# Patient Record
Sex: FEMALE | Race: BLACK OR AFRICAN AMERICAN
[De-identification: names, ages, dates, MRNs, and addresses within clinical notes are randomized per-mention and may not be internally consistent; named-entity substitution may affect disease eponyms.]

---

## 2017-01-17 ENCOUNTER — HOSPITAL ENCOUNTER (EMERGENCY)
Dept: HOSPITAL 35 - ER | Age: 62
Discharge: HOME | End: 2017-01-17
Payer: COMMERCIAL

## 2017-01-17 VITALS — HEIGHT: 63 IN | WEIGHT: 223 LBS | BODY MASS INDEX: 39.51 KG/M2

## 2017-01-17 DIAGNOSIS — Z88.1: ICD-10-CM

## 2017-01-17 DIAGNOSIS — R10.2: ICD-10-CM

## 2017-01-17 DIAGNOSIS — R10.9: Primary | ICD-10-CM

## 2017-01-17 DIAGNOSIS — Z88.5: ICD-10-CM

## 2017-01-17 DIAGNOSIS — Z91.018: ICD-10-CM

## 2017-01-17 DIAGNOSIS — Z90.710: ICD-10-CM

## 2017-01-17 DIAGNOSIS — Z96.659: ICD-10-CM

## 2017-01-17 DIAGNOSIS — Z87.442: ICD-10-CM

## 2017-01-17 DIAGNOSIS — Z88.8: ICD-10-CM

## 2017-01-17 LAB
ALBUMIN SERPL-MCNC: 3.6 G/DL (ref 3.4–5)
ALP SERPL-CCNC: 102 U/L (ref 46–116)
ALT SERPL-CCNC: 33 U/L (ref 30–65)
ANION GAP SERPL CALC-SCNC: 8 MMOL/L (ref 7–16)
APTT BLD: 25.4 SECONDS (ref 24.5–32.8)
AST SERPL-CCNC: 14 U/L (ref 15–37)
BILIRUB SERPL-MCNC: 0.2 MG/DL
BILIRUB UR-MCNC: NEGATIVE MG/DL
BUN SERPL-MCNC: 13 MG/DL (ref 7–18)
CALCIUM SERPL-MCNC: 9.2 MG/DL (ref 8.5–10.1)
CHLORIDE SERPL-SCNC: 101 MMOL/L (ref 98–107)
CO2 SERPL-SCNC: 31 MMOL/L (ref 21–32)
COLOR UR: YELLOW
CREAT SERPL-MCNC: 0.8 MG/DL (ref 0.6–1.3)
ERYTHROCYTE [DISTWIDTH] IN BLOOD BY AUTOMATED COUNT: 15.1 % (ref 10.5–14.5)
GLUCOSE SERPL-MCNC: 150 MG/DL (ref 70–99)
HCT VFR BLD CALC: 38.8 % (ref 37–47)
HGB BLD-MCNC: 12.9 GM/DL (ref 12–15)
INR PPP: 1.1
KETONES UR STRIP-MCNC: NEGATIVE MG/DL
MCH RBC QN AUTO: 28.9 PG (ref 26–34)
MCHC RBC AUTO-ENTMCNC: 33.4 % (ref 28–37)
MCV RBC: 86.7 FL (ref 80–100)
PLATELET # BLD: 181 THOU/UL (ref 150–400)
POTASSIUM SERPL-SCNC: 3.9 MMOL/L (ref 3.5–5.1)
PROT SERPL-MCNC: 7.5 G/DL (ref 6.4–8.2)
PROTHROMBIN TIME: 10.9 SECONDS (ref 9.3–11.4)
RBC # BLD AUTO: 4.48 MIL/UL (ref 4.2–5)
RBC # UR STRIP: NEGATIVE /UL
SODIUM SERPL-SCNC: 140 MMOL/L (ref 136–145)
SP GR UR STRIP: 1.02 (ref 1–1.03)
URINE GLUCOSE-RANDOM*: NEGATIVE
URINE LEUKOCYTES-REFLEX: NEGATIVE
URINE PROTEIN (DIPSTICK): NEGATIVE
UROBILINOGEN UR STRIP-ACNC: 0.2 E.U./DL (ref 0.2–1)
WBC # BLD AUTO: 5.7 THOU/UL (ref 4–11)

## 2017-03-21 ENCOUNTER — HOSPITAL ENCOUNTER (EMERGENCY)
Dept: HOSPITAL 35 - ER | Age: 62
Discharge: HOME | End: 2017-03-21
Payer: COMMERCIAL

## 2017-03-21 VITALS — BODY MASS INDEX: 37.74 KG/M2 | WEIGHT: 213.01 LBS | HEIGHT: 63 IN

## 2017-03-21 DIAGNOSIS — Z96.653: ICD-10-CM

## 2017-03-21 DIAGNOSIS — Z91.018: ICD-10-CM

## 2017-03-21 DIAGNOSIS — Z88.8: ICD-10-CM

## 2017-03-21 DIAGNOSIS — Z98.890: ICD-10-CM

## 2017-03-21 DIAGNOSIS — Z87.442: ICD-10-CM

## 2017-03-21 DIAGNOSIS — Z88.1: ICD-10-CM

## 2017-03-21 DIAGNOSIS — N39.0: ICD-10-CM

## 2017-03-21 DIAGNOSIS — Z88.5: ICD-10-CM

## 2017-03-21 DIAGNOSIS — Z90.710: ICD-10-CM

## 2017-03-21 DIAGNOSIS — A60.04: Primary | ICD-10-CM

## 2017-03-21 LAB
BILIRUB UR-MCNC: NEGATIVE MG/DL
COLOR UR: YELLOW
KETONES UR STRIP-MCNC: NEGATIVE MG/DL
RBC # UR STRIP: (no result) /UL
RBC #/AREA URNS HPF: (no result) /HPF (ref 0–2)
SP GR UR STRIP: 1.02 (ref 1–1.03)
SQUAMOUS: (no result) /LPF (ref 0–3)
TRANSITIONAL EPITHEL CELL: (no result) /LPF
URINE GLUCOSE-RANDOM*: (no result)
URINE LEUKOCYTES-REFLEX: (no result)
URINE PROTEIN (DIPSTICK): (no result)
URINE WBC-REFLEX: (no result) /HPF (ref 0–5)
UROBILINOGEN UR STRIP-ACNC: 0.2 E.U./DL (ref 0.2–1)

## 2017-09-07 ENCOUNTER — HOSPITAL ENCOUNTER (EMERGENCY)
Dept: HOSPITAL 35 - ER | Age: 62
Discharge: HOME | End: 2017-09-07
Payer: COMMERCIAL

## 2017-09-07 VITALS — HEIGHT: 64 IN | WEIGHT: 214 LBS | BODY MASS INDEX: 36.54 KG/M2

## 2017-09-07 DIAGNOSIS — Z96.659: ICD-10-CM

## 2017-09-07 DIAGNOSIS — Z88.1: ICD-10-CM

## 2017-09-07 DIAGNOSIS — Z88.8: ICD-10-CM

## 2017-09-07 DIAGNOSIS — Z87.442: ICD-10-CM

## 2017-09-07 DIAGNOSIS — R07.89: Primary | ICD-10-CM

## 2017-09-07 DIAGNOSIS — Z98.890: ICD-10-CM

## 2017-09-07 DIAGNOSIS — Z91.018: ICD-10-CM

## 2017-09-07 DIAGNOSIS — Z90.710: ICD-10-CM

## 2017-09-07 LAB
ANION GAP SERPL CALC-SCNC: 6 MMOL/L (ref 7–16)
BASOPHILS NFR BLD AUTO: 0.5 % (ref 0–2)
BUN SERPL-MCNC: 11 MG/DL (ref 7–18)
CALCIUM SERPL-MCNC: 9.5 MG/DL (ref 8.5–10.1)
CHLORIDE SERPL-SCNC: 106 MMOL/L (ref 98–107)
CO2 SERPL-SCNC: 28 MMOL/L (ref 21–32)
CREAT SERPL-MCNC: 0.9 MG/DL (ref 0.6–1)
EOSINOPHIL NFR BLD: 3.5 % (ref 0–3)
ERYTHROCYTE [DISTWIDTH] IN BLOOD BY AUTOMATED COUNT: 16.2 % (ref 10.5–14.5)
GLUCOSE SERPL-MCNC: 121 MG/DL (ref 74–106)
GRANULOCYTES NFR BLD MANUAL: 52.2 % (ref 36–66)
HCT VFR BLD CALC: 38.9 % (ref 37–47)
HGB BLD-MCNC: 13.4 GM/DL (ref 12–15)
LYMPHOCYTES NFR BLD AUTO: 36.5 % (ref 24–44)
MANUAL DIFFERENTIAL PERFORMED BLD QL: NO
MCH RBC QN AUTO: 29.1 PG (ref 26–34)
MCHC RBC AUTO-ENTMCNC: 34.4 G/DL (ref 28–37)
MCV RBC: 84.5 FL (ref 80–100)
MONOCYTES NFR BLD: 7.3 % (ref 1–8)
NEUTROPHILS # BLD: 4 THOU/UL (ref 1.4–8.2)
PLATELET # BLD: 217 THOU/UL (ref 150–400)
POTASSIUM SERPL-SCNC: 4 MMOL/L (ref 3.5–5.1)
RBC # BLD AUTO: 4.6 MIL/UL (ref 4.2–5)
SODIUM SERPL-SCNC: 140 MMOL/L (ref 136–145)
TROPONIN I SERPL-MCNC: 0.04 NG/ML
WBC # BLD AUTO: 7.7 THOU/UL (ref 4–11)

## 2017-12-27 ENCOUNTER — HOSPITAL ENCOUNTER (INPATIENT)
Dept: HOSPITAL 35 - ER | Age: 62
LOS: 2 days | Discharge: HOME | DRG: 313 | End: 2017-12-29
Attending: INTERNAL MEDICINE | Admitting: INTERNAL MEDICINE
Payer: COMMERCIAL

## 2017-12-27 VITALS — BODY MASS INDEX: 38.93 KG/M2 | HEIGHT: 64.02 IN | WEIGHT: 228 LBS

## 2017-12-27 VITALS — DIASTOLIC BLOOD PRESSURE: 63 MMHG | SYSTOLIC BLOOD PRESSURE: 146 MMHG

## 2017-12-27 VITALS — DIASTOLIC BLOOD PRESSURE: 63 MMHG | SYSTOLIC BLOOD PRESSURE: 113 MMHG

## 2017-12-27 VITALS — SYSTOLIC BLOOD PRESSURE: 142 MMHG | DIASTOLIC BLOOD PRESSURE: 40 MMHG

## 2017-12-27 VITALS — SYSTOLIC BLOOD PRESSURE: 133 MMHG | DIASTOLIC BLOOD PRESSURE: 63 MMHG

## 2017-12-27 VITALS — DIASTOLIC BLOOD PRESSURE: 96 MMHG | SYSTOLIC BLOOD PRESSURE: 181 MMHG

## 2017-12-27 DIAGNOSIS — Z96.653: ICD-10-CM

## 2017-12-27 DIAGNOSIS — Z90.710: ICD-10-CM

## 2017-12-27 DIAGNOSIS — Z82.49: ICD-10-CM

## 2017-12-27 DIAGNOSIS — Z87.442: ICD-10-CM

## 2017-12-27 DIAGNOSIS — R07.9: Primary | ICD-10-CM

## 2017-12-27 DIAGNOSIS — G89.29: ICD-10-CM

## 2017-12-27 DIAGNOSIS — Z83.3: ICD-10-CM

## 2017-12-27 DIAGNOSIS — Z79.4: ICD-10-CM

## 2017-12-27 DIAGNOSIS — E78.00: ICD-10-CM

## 2017-12-27 DIAGNOSIS — E66.9: ICD-10-CM

## 2017-12-27 DIAGNOSIS — Z88.8: ICD-10-CM

## 2017-12-27 DIAGNOSIS — Z91.010: ICD-10-CM

## 2017-12-27 DIAGNOSIS — Z88.1: ICD-10-CM

## 2017-12-27 DIAGNOSIS — E11.43: ICD-10-CM

## 2017-12-27 DIAGNOSIS — Z86.711: ICD-10-CM

## 2017-12-27 DIAGNOSIS — E78.5: ICD-10-CM

## 2017-12-27 DIAGNOSIS — Z80.1: ICD-10-CM

## 2017-12-27 DIAGNOSIS — Z79.899: ICD-10-CM

## 2017-12-27 DIAGNOSIS — M54.9: ICD-10-CM

## 2017-12-27 DIAGNOSIS — K31.84: ICD-10-CM

## 2017-12-27 DIAGNOSIS — I10: ICD-10-CM

## 2017-12-27 LAB
ANION GAP SERPL CALC-SCNC: 5 MMOL/L (ref 7–16)
BASOPHILS NFR BLD AUTO: 0.9 % (ref 0–2)
BUN SERPL-MCNC: 15 MG/DL (ref 7–18)
CALCIUM SERPL-MCNC: 9.2 MG/DL (ref 8.5–10.1)
CHLORIDE SERPL-SCNC: 107 MMOL/L (ref 98–107)
CO2 SERPL-SCNC: 29 MMOL/L (ref 21–32)
CREAT SERPL-MCNC: 0.8 MG/DL (ref 0.6–1)
EOSINOPHIL NFR BLD: 1.3 % (ref 0–3)
ERYTHROCYTE [DISTWIDTH] IN BLOOD BY AUTOMATED COUNT: 13.9 % (ref 10.5–14.5)
GLUCOSE SERPL-MCNC: 163 MG/DL (ref 74–106)
GRANULOCYTES NFR BLD MANUAL: 50.2 % (ref 36–66)
HCT VFR BLD CALC: 39.5 % (ref 37–47)
HGB BLD-MCNC: 13.2 GM/DL (ref 12–15)
LYMPHOCYTES NFR BLD AUTO: 40.4 % (ref 24–44)
MCH RBC QN AUTO: 30.1 PG (ref 26–34)
MCHC RBC AUTO-ENTMCNC: 33.4 G/DL (ref 28–37)
MCV RBC: 90.2 FL (ref 80–100)
MONOCYTES NFR BLD: 7.2 % (ref 1–8)
NEUTROPHILS # BLD: 3.3 THOU/UL (ref 1.4–8.2)
PLATELET # BLD: 211 THOU/UL (ref 150–400)
POTASSIUM SERPL-SCNC: 3.9 MMOL/L (ref 3.5–5.1)
RBC # BLD AUTO: 4.38 MIL/UL (ref 4.2–5)
SODIUM SERPL-SCNC: 141 MMOL/L (ref 136–145)
TROPONIN I SERPL-MCNC: 0.08 NG/ML (ref ?–0.06)
WBC # BLD AUTO: 6.5 THOU/UL (ref 4–11)

## 2017-12-27 PROCEDURE — 10879: CPT

## 2017-12-28 VITALS — DIASTOLIC BLOOD PRESSURE: 75 MMHG | SYSTOLIC BLOOD PRESSURE: 136 MMHG

## 2017-12-28 VITALS — DIASTOLIC BLOOD PRESSURE: 83 MMHG | SYSTOLIC BLOOD PRESSURE: 141 MMHG

## 2017-12-28 VITALS — DIASTOLIC BLOOD PRESSURE: 75 MMHG | SYSTOLIC BLOOD PRESSURE: 120 MMHG

## 2017-12-28 VITALS — DIASTOLIC BLOOD PRESSURE: 61 MMHG | SYSTOLIC BLOOD PRESSURE: 140 MMHG

## 2017-12-28 VITALS — SYSTOLIC BLOOD PRESSURE: 125 MMHG | DIASTOLIC BLOOD PRESSURE: 74 MMHG

## 2017-12-28 LAB
CHOLEST SERPL-MCNC: 145 MG/DL (ref ?–200)
HDLC SERPL-MCNC: 55 MG/DL (ref 40–?)
LDLC SERPL-MCNC: 76 MG/DL (ref ?–100)
TC:HDL: 2.6 RATIO
TRIGL SERPL-MCNC: 71 MG/DL (ref ?–150)
TROPONIN I SERPL-MCNC: 0.08 NG/ML (ref ?–0.06)
VLDLC SERPL CALC-MCNC: 14 MG/DL (ref ?–40)

## 2017-12-29 VITALS — DIASTOLIC BLOOD PRESSURE: 70 MMHG | SYSTOLIC BLOOD PRESSURE: 123 MMHG

## 2017-12-29 LAB
EST. AVERAGE GLUCOSE BLD GHB EST-MCNC: 134 MG/DL
GLYCOHEMOGLOBIN (HGB A1C): 6.3 % (ref 4.8–5.6)

## 2018-01-13 ENCOUNTER — HOSPITAL ENCOUNTER (INPATIENT)
Dept: HOSPITAL 35 - ER | Age: 63
LOS: 2 days | Discharge: HOME | DRG: 392 | End: 2018-01-15
Attending: HOSPITALIST | Admitting: HOSPITALIST
Payer: COMMERCIAL

## 2018-01-13 VITALS — SYSTOLIC BLOOD PRESSURE: 159 MMHG | DIASTOLIC BLOOD PRESSURE: 78 MMHG

## 2018-01-13 VITALS — SYSTOLIC BLOOD PRESSURE: 152 MMHG | DIASTOLIC BLOOD PRESSURE: 72 MMHG

## 2018-01-13 VITALS — WEIGHT: 220 LBS | BODY MASS INDEX: 37.56 KG/M2 | HEIGHT: 64.02 IN

## 2018-01-13 VITALS — DIASTOLIC BLOOD PRESSURE: 87 MMHG | SYSTOLIC BLOOD PRESSURE: 150 MMHG

## 2018-01-13 VITALS — DIASTOLIC BLOOD PRESSURE: 75 MMHG | SYSTOLIC BLOOD PRESSURE: 131 MMHG

## 2018-01-13 DIAGNOSIS — N39.0: ICD-10-CM

## 2018-01-13 DIAGNOSIS — Z79.899: ICD-10-CM

## 2018-01-13 DIAGNOSIS — Z96.653: ICD-10-CM

## 2018-01-13 DIAGNOSIS — Z90.49: ICD-10-CM

## 2018-01-13 DIAGNOSIS — Z87.442: ICD-10-CM

## 2018-01-13 DIAGNOSIS — E83.42: ICD-10-CM

## 2018-01-13 DIAGNOSIS — E11.9: ICD-10-CM

## 2018-01-13 DIAGNOSIS — E78.00: ICD-10-CM

## 2018-01-13 DIAGNOSIS — E87.2: ICD-10-CM

## 2018-01-13 DIAGNOSIS — Z91.018: ICD-10-CM

## 2018-01-13 DIAGNOSIS — Z90.710: ICD-10-CM

## 2018-01-13 DIAGNOSIS — Z88.1: ICD-10-CM

## 2018-01-13 DIAGNOSIS — K29.70: ICD-10-CM

## 2018-01-13 DIAGNOSIS — E87.6: ICD-10-CM

## 2018-01-13 DIAGNOSIS — I10: ICD-10-CM

## 2018-01-13 DIAGNOSIS — A08.4: Primary | ICD-10-CM

## 2018-01-13 DIAGNOSIS — L02.91: ICD-10-CM

## 2018-01-13 DIAGNOSIS — Z86.711: ICD-10-CM

## 2018-01-13 DIAGNOSIS — Z88.8: ICD-10-CM

## 2018-01-13 LAB
ALBUMIN SERPL-MCNC: 3.4 G/DL (ref 3.4–5)
ALT SERPL-CCNC: 25 U/L (ref 30–65)
ANION GAP SERPL CALC-SCNC: 11 MMOL/L (ref 7–16)
AST SERPL-CCNC: 13 U/L (ref 15–37)
BACTERIA-REFLEX: (no result) /HPF
BILIRUB SERPL-MCNC: 0.3 MG/DL
BILIRUB UR-MCNC: NEGATIVE MG/DL
BUN SERPL-MCNC: 9 MG/DL (ref 7–18)
CALCIUM SERPL-MCNC: 9.2 MG/DL (ref 8.5–10.1)
CHLORIDE SERPL-SCNC: 108 MMOL/L (ref 98–107)
CO2 SERPL-SCNC: 25 MMOL/L (ref 21–32)
COLOR UR: YELLOW
CREAT SERPL-MCNC: 0.8 MG/DL (ref 0.6–1)
ERYTHROCYTE [DISTWIDTH] IN BLOOD BY AUTOMATED COUNT: 13.2 % (ref 10.5–14.5)
GLUCOSE SERPL-MCNC: 153 MG/DL (ref 74–106)
HCT VFR BLD CALC: 41.8 % (ref 37–47)
HGB BLD-MCNC: 13.9 GM/DL (ref 12–15)
KETONES UR STRIP-MCNC: NEGATIVE MG/DL
MCH RBC QN AUTO: 29.6 PG (ref 26–34)
MCHC RBC AUTO-ENTMCNC: 33.3 G/DL (ref 28–37)
MCV RBC: 88.9 FL (ref 80–100)
PLATELET # BLD: 211 THOU/UL (ref 150–400)
POTASSIUM SERPL-SCNC: 3 MMOL/L (ref 3.5–5.1)
PROT SERPL-MCNC: 7.3 G/DL (ref 6.4–8.2)
RBC # BLD AUTO: 4.7 MIL/UL (ref 4.2–5)
RBC # UR STRIP: (no result) /UL
SODIUM SERPL-SCNC: 144 MMOL/L (ref 136–145)
SP GR UR STRIP: >= 1.03 (ref 1–1.03)
URINE CLARITY: CLEAR
URINE GLUCOSE-RANDOM*: NEGATIVE
URINE LEUKOCYTES-REFLEX: NEGATIVE
URINE NITRITE-REFLEX: NEGATIVE
URINE PROTEIN (DIPSTICK): (no result)
UROBILINOGEN UR STRIP-ACNC: 0.2 E.U./DL (ref 0.2–1)
WBC # BLD AUTO: 7.6 THOU/UL (ref 4–11)

## 2018-01-14 VITALS — DIASTOLIC BLOOD PRESSURE: 84 MMHG | SYSTOLIC BLOOD PRESSURE: 146 MMHG

## 2018-01-14 VITALS — SYSTOLIC BLOOD PRESSURE: 142 MMHG | DIASTOLIC BLOOD PRESSURE: 75 MMHG

## 2018-01-14 VITALS — DIASTOLIC BLOOD PRESSURE: 50 MMHG | SYSTOLIC BLOOD PRESSURE: 120 MMHG

## 2018-01-14 VITALS — SYSTOLIC BLOOD PRESSURE: 141 MMHG | DIASTOLIC BLOOD PRESSURE: 67 MMHG

## 2018-01-14 LAB
ANION GAP SERPL CALC-SCNC: 8 MMOL/L (ref 7–16)
BUN SERPL-MCNC: 6 MG/DL (ref 7–18)
CALCIUM SERPL-MCNC: 8.5 MG/DL (ref 8.5–10.1)
CHLORIDE SERPL-SCNC: 107 MMOL/L (ref 98–107)
CO2 SERPL-SCNC: 26 MMOL/L (ref 21–32)
CREAT SERPL-MCNC: 0.7 MG/DL (ref 0.6–1)
ERYTHROCYTE [DISTWIDTH] IN BLOOD BY AUTOMATED COUNT: 13.2 % (ref 10.5–14.5)
GLUCOSE SERPL-MCNC: 136 MG/DL (ref 74–106)
HCT VFR BLD CALC: 36.9 % (ref 37–47)
HGB BLD-MCNC: 12.5 GM/DL (ref 12–15)
MAGNESIUM SERPL-MCNC: 1.2 MG/DL (ref 1.8–2.4)
MCH RBC QN AUTO: 30.2 PG (ref 26–34)
MCHC RBC AUTO-ENTMCNC: 33.8 G/DL (ref 28–37)
MCV RBC: 89.3 FL (ref 80–100)
PLATELET # BLD: 169 THOU/UL (ref 150–400)
POTASSIUM SERPL-SCNC: 3.1 MMOL/L (ref 3.5–5.1)
RBC # BLD AUTO: 4.13 MIL/UL (ref 4.2–5)
SODIUM SERPL-SCNC: 141 MMOL/L (ref 136–145)
WBC # BLD AUTO: 7.2 THOU/UL (ref 4–11)

## 2018-01-15 VITALS — SYSTOLIC BLOOD PRESSURE: 149 MMHG | DIASTOLIC BLOOD PRESSURE: 79 MMHG

## 2018-01-15 VITALS — DIASTOLIC BLOOD PRESSURE: 67 MMHG | SYSTOLIC BLOOD PRESSURE: 125 MMHG

## 2018-01-15 LAB
ANION GAP SERPL CALC-SCNC: 8 MMOL/L (ref 7–16)
BUN SERPL-MCNC: 6 MG/DL (ref 7–18)
CALCIUM SERPL-MCNC: 8.7 MG/DL (ref 8.5–10.1)
CHLORIDE SERPL-SCNC: 108 MMOL/L (ref 98–107)
CO2 SERPL-SCNC: 27 MMOL/L (ref 21–32)
CREAT SERPL-MCNC: 0.8 MG/DL (ref 0.6–1)
ERYTHROCYTE [DISTWIDTH] IN BLOOD BY AUTOMATED COUNT: 13 % (ref 10.5–14.5)
GLUCOSE SERPL-MCNC: 146 MG/DL (ref 74–106)
HCT VFR BLD CALC: 36.9 % (ref 37–47)
HGB BLD-MCNC: 12.5 GM/DL (ref 12–15)
MAGNESIUM SERPL-MCNC: 1.7 MG/DL (ref 1.8–2.4)
MCH RBC QN AUTO: 30.1 PG (ref 26–34)
MCHC RBC AUTO-ENTMCNC: 34 G/DL (ref 28–37)
MCV RBC: 88.4 FL (ref 80–100)
PLATELET # BLD: 213 THOU/UL (ref 150–400)
POTASSIUM SERPL-SCNC: 3.3 MMOL/L (ref 3.5–5.1)
RBC # BLD AUTO: 4.17 MIL/UL (ref 4.2–5)
SODIUM SERPL-SCNC: 143 MMOL/L (ref 136–145)
WBC # BLD AUTO: 6.3 THOU/UL (ref 4–11)

## 2018-01-24 ENCOUNTER — HOSPITAL ENCOUNTER (EMERGENCY)
Dept: HOSPITAL 35 - ER | Age: 63
LOS: 1 days | Discharge: HOME | End: 2018-01-25
Payer: COMMERCIAL

## 2018-01-24 VITALS — BODY MASS INDEX: 37.56 KG/M2 | HEIGHT: 64 IN | WEIGHT: 220 LBS

## 2018-01-24 DIAGNOSIS — I10: ICD-10-CM

## 2018-01-24 DIAGNOSIS — Z88.1: ICD-10-CM

## 2018-01-24 DIAGNOSIS — E11.9: ICD-10-CM

## 2018-01-24 DIAGNOSIS — Z88.8: ICD-10-CM

## 2018-01-24 DIAGNOSIS — Z88.6: ICD-10-CM

## 2018-01-24 DIAGNOSIS — N12: Primary | ICD-10-CM

## 2018-01-24 DIAGNOSIS — M79.7: ICD-10-CM

## 2018-01-24 DIAGNOSIS — Z90.710: ICD-10-CM

## 2018-01-24 DIAGNOSIS — R19.7: ICD-10-CM

## 2018-01-24 DIAGNOSIS — G47.30: ICD-10-CM

## 2018-01-24 LAB
ALBUMIN SERPL-MCNC: 3.6 G/DL (ref 3.4–5)
ALT SERPL-CCNC: 27 U/L (ref 30–65)
ANION GAP SERPL CALC-SCNC: 9 MMOL/L (ref 7–16)
AST SERPL-CCNC: 16 U/L (ref 15–37)
BACTERIA-REFLEX: (no result) /HPF
BASOPHILS NFR BLD AUTO: 0.2 % (ref 0–2)
BILIRUB SERPL-MCNC: 0.2 MG/DL
BILIRUB UR-MCNC: NEGATIVE MG/DL
BUN SERPL-MCNC: 9 MG/DL (ref 7–18)
CALCIUM SERPL-MCNC: 9.1 MG/DL (ref 8.5–10.1)
CHLORIDE SERPL-SCNC: 104 MMOL/L (ref 98–107)
CO2 SERPL-SCNC: 29 MMOL/L (ref 21–32)
COLOR UR: YELLOW
CREAT SERPL-MCNC: 0.7 MG/DL (ref 0.6–1)
EOSINOPHIL NFR BLD: 1.2 % (ref 0–3)
ERYTHROCYTE [DISTWIDTH] IN BLOOD BY AUTOMATED COUNT: 13.4 % (ref 10.5–14.5)
GLUCOSE SERPL-MCNC: 156 MG/DL (ref 74–106)
GRANULOCYTES NFR BLD MANUAL: 51.9 % (ref 36–66)
HCT VFR BLD CALC: 39.9 % (ref 37–47)
HGB BLD-MCNC: 13.3 GM/DL (ref 12–15)
KETONES UR STRIP-MCNC: NEGATIVE MG/DL
LIPASE: 170 U/L (ref 73–393)
LYMPHOCYTES NFR BLD AUTO: 39 % (ref 24–44)
MCH RBC QN AUTO: 29.7 PG (ref 26–34)
MCHC RBC AUTO-ENTMCNC: 33.4 G/DL (ref 28–37)
MCV RBC: 88.8 FL (ref 80–100)
MONOCYTES NFR BLD: 7.7 % (ref 1–8)
NEUTROPHILS # BLD: 3.6 THOU/UL (ref 1.4–8.2)
PLATELET # BLD: 249 THOU/UL (ref 150–400)
POTASSIUM SERPL-SCNC: 3.9 MMOL/L (ref 3.5–5.1)
PROT SERPL-MCNC: 7.8 G/DL (ref 6.4–8.2)
RBC # BLD AUTO: 4.49 MIL/UL (ref 4.2–5)
RBC # UR STRIP: (no result) /UL
RBC #/AREA URNS HPF: (no result) /HPF (ref 0–2)
SODIUM SERPL-SCNC: 142 MMOL/L (ref 136–145)
SP GR UR STRIP: 1.01 (ref 1–1.03)
URINE CLARITY: (no result)
URINE GLUCOSE-RANDOM*: NEGATIVE
URINE LEUKOCYTES-REFLEX: (no result)
URINE NITRITE-REFLEX: NEGATIVE
URINE PROTEIN (DIPSTICK): (no result)
UROBILINOGEN UR STRIP-ACNC: 0.2 E.U./DL (ref 0.2–1)
WBC # BLD AUTO: 7 THOU/UL (ref 4–11)

## 2018-01-25 VITALS — SYSTOLIC BLOOD PRESSURE: 111 MMHG | DIASTOLIC BLOOD PRESSURE: 56 MMHG

## 2018-03-05 ENCOUNTER — HOSPITAL ENCOUNTER (EMERGENCY)
Dept: HOSPITAL 35 - ER | Age: 63
LOS: 1 days | Discharge: HOME | End: 2018-03-06
Payer: COMMERCIAL

## 2018-03-05 VITALS — WEIGHT: 221.01 LBS | HEIGHT: 64 IN | BODY MASS INDEX: 37.73 KG/M2

## 2018-03-05 DIAGNOSIS — R10.9: ICD-10-CM

## 2018-03-05 DIAGNOSIS — B37.49: Primary | ICD-10-CM

## 2018-03-05 DIAGNOSIS — E86.0: ICD-10-CM

## 2018-03-05 LAB
ALBUMIN SERPL-MCNC: 3.8 G/DL (ref 3.4–5)
ALT SERPL-CCNC: 37 U/L (ref 30–65)
ANION GAP SERPL CALC-SCNC: 8 MMOL/L (ref 7–16)
AST SERPL-CCNC: 18 U/L (ref 15–37)
BACTERIA #/AREA URNS HPF: (no result) /HPF
BASOPHILS NFR BLD AUTO: 0.6 % (ref 0–2)
BILIRUB DIRECT SERPL-MCNC: < 0.1 MG/DL
BILIRUB SERPL-MCNC: 0.2 MG/DL
BILIRUB UR-MCNC: NEGATIVE MG/DL
BUN SERPL-MCNC: 14 MG/DL (ref 7–18)
CALCIUM SERPL-MCNC: 9.6 MG/DL (ref 8.5–10.1)
CHLORIDE SERPL-SCNC: 107 MMOL/L (ref 98–107)
CO2 SERPL-SCNC: 27 MMOL/L (ref 21–32)
COLOR UR: YELLOW
CREAT SERPL-MCNC: 0.8 MG/DL (ref 0.6–1)
EOSINOPHIL NFR BLD: 1.8 % (ref 0–3)
ERYTHROCYTE [DISTWIDTH] IN BLOOD BY AUTOMATED COUNT: 14 % (ref 10.5–14.5)
GLUCOSE SERPL-MCNC: 152 MG/DL (ref 74–106)
GRANULOCYTES NFR BLD MANUAL: 51 % (ref 36–66)
HCT VFR BLD CALC: 42.2 % (ref 37–47)
HGB BLD-MCNC: 14 GM/DL (ref 12–15)
KETONES UR STRIP-MCNC: (no result) MG/DL
LIPASE: 209 U/L (ref 73–393)
LYMPHOCYTES NFR BLD AUTO: 39.6 % (ref 24–44)
MCH RBC QN AUTO: 29.3 PG (ref 26–34)
MCHC RBC AUTO-ENTMCNC: 33.1 G/DL (ref 28–37)
MCV RBC: 88.6 FL (ref 80–100)
MONOCYTES NFR BLD: 7 % (ref 1–8)
NEUTROPHILS # BLD: 4.1 THOU/UL (ref 1.4–8.2)
NITRITE UR QL STRIP: NEGATIVE
PLATELET # BLD: 238 THOU/UL (ref 150–400)
POTASSIUM SERPL-SCNC: 3.6 MMOL/L (ref 3.5–5.1)
PROT SERPL-MCNC: 8.1 G/DL (ref 6.4–8.2)
RBC # BLD AUTO: 4.77 MIL/UL (ref 4.2–5)
RBC # UR STRIP: (no result) /UL
SODIUM SERPL-SCNC: 142 MMOL/L (ref 136–145)
SP GR UR STRIP: >= 1.03 (ref 1–1.03)
SQUAMOUS: (no result) /LPF (ref 0–3)
URINE CLARITY: CLEAR
URINE GLUCOSE-RANDOM*: (no result)
URINE LEUKOCYTES: NEGATIVE
URINE PROTEIN (DIPSTICK): (no result)
UROBILINOGEN UR STRIP-ACNC: 0.2 E.U./DL (ref 0.2–1)
WBC # BLD AUTO: 8 THOU/UL (ref 4–11)

## 2018-03-06 VITALS — SYSTOLIC BLOOD PRESSURE: 123 MMHG | DIASTOLIC BLOOD PRESSURE: 85 MMHG

## 2018-03-25 ENCOUNTER — HOSPITAL ENCOUNTER (EMERGENCY)
Dept: HOSPITAL 35 - ER | Age: 63
Discharge: HOME | End: 2018-03-25
Payer: COMMERCIAL

## 2018-03-25 VITALS — HEIGHT: 64 IN | WEIGHT: 217.99 LBS | BODY MASS INDEX: 37.22 KG/M2

## 2018-03-25 VITALS — WEIGHT: 217.99 LBS | HEIGHT: 64 IN | BODY MASS INDEX: 37.22 KG/M2

## 2018-03-25 DIAGNOSIS — M79.7: ICD-10-CM

## 2018-03-25 DIAGNOSIS — Z90.710: ICD-10-CM

## 2018-03-25 DIAGNOSIS — Z91.018: ICD-10-CM

## 2018-03-25 DIAGNOSIS — Z88.8: ICD-10-CM

## 2018-03-25 DIAGNOSIS — G47.30: ICD-10-CM

## 2018-03-25 DIAGNOSIS — Z86.711: ICD-10-CM

## 2018-03-25 DIAGNOSIS — Z87.442: ICD-10-CM

## 2018-03-25 DIAGNOSIS — I10: ICD-10-CM

## 2018-03-25 DIAGNOSIS — Z88.1: ICD-10-CM

## 2018-03-25 DIAGNOSIS — Z98.890: ICD-10-CM

## 2018-03-25 DIAGNOSIS — E11.43: ICD-10-CM

## 2018-03-25 DIAGNOSIS — E11.65: Primary | ICD-10-CM

## 2018-03-25 DIAGNOSIS — J06.9: Primary | ICD-10-CM

## 2018-03-25 DIAGNOSIS — R07.9: ICD-10-CM

## 2018-03-25 DIAGNOSIS — K31.84: ICD-10-CM

## 2018-03-25 LAB
ALBUMIN SERPL-MCNC: 3.6 G/DL (ref 3.4–5)
ALT SERPL-CCNC: 51 U/L (ref 30–65)
ANION GAP SERPL CALC-SCNC: 7 MMOL/L (ref 7–16)
AST SERPL-CCNC: 20 U/L (ref 15–37)
BASOPHILS NFR BLD AUTO: 0.5 % (ref 0–2)
BILIRUB SERPL-MCNC: 0.2 MG/DL
BUN SERPL-MCNC: 16 MG/DL (ref 7–18)
CALCIUM SERPL-MCNC: 9.8 MG/DL (ref 8.5–10.1)
CHLORIDE SERPL-SCNC: 105 MMOL/L (ref 98–107)
CO2 SERPL-SCNC: 27 MMOL/L (ref 21–32)
CREAT SERPL-MCNC: 1 MG/DL (ref 0.6–1)
EOSINOPHIL NFR BLD: 1.3 % (ref 0–3)
ERYTHROCYTE [DISTWIDTH] IN BLOOD BY AUTOMATED COUNT: 14.1 % (ref 10.5–14.5)
GLUCOSE SERPL-MCNC: 138 MG/DL (ref 74–106)
GRANULOCYTES NFR BLD MANUAL: 49.4 % (ref 36–66)
HCT VFR BLD CALC: 39.5 % (ref 37–47)
HGB BLD-MCNC: 13.1 GM/DL (ref 12–15)
LYMPHOCYTES NFR BLD AUTO: 40.9 % (ref 24–44)
MCH RBC QN AUTO: 29.1 PG (ref 26–34)
MCHC RBC AUTO-ENTMCNC: 33.3 G/DL (ref 28–37)
MCV RBC: 87.6 FL (ref 80–100)
MONOCYTES NFR BLD: 7.9 % (ref 1–8)
NEUTROPHILS # BLD: 3.4 THOU/UL (ref 1.4–8.2)
PLATELET # BLD: 217 THOU/UL (ref 150–400)
POTASSIUM SERPL-SCNC: 4 MMOL/L (ref 3.5–5.1)
PROT SERPL-MCNC: 7.6 G/DL (ref 6.4–8.2)
RBC # BLD AUTO: 4.51 MIL/UL (ref 4.2–5)
SODIUM SERPL-SCNC: 139 MMOL/L (ref 136–145)
TROPONIN I SERPL-MCNC: 0.04 NG/ML (ref ?–0.06)
WBC # BLD AUTO: 6.9 THOU/UL (ref 4–11)

## 2018-11-13 ENCOUNTER — HOSPITAL ENCOUNTER (EMERGENCY)
Dept: HOSPITAL 35 - ER | Age: 63
Discharge: HOME | End: 2018-11-13
Payer: COMMERCIAL

## 2018-11-13 VITALS — SYSTOLIC BLOOD PRESSURE: 109 MMHG | DIASTOLIC BLOOD PRESSURE: 54 MMHG

## 2018-11-13 VITALS — WEIGHT: 187 LBS | HEIGHT: 64 IN | BODY MASS INDEX: 31.92 KG/M2

## 2018-11-13 DIAGNOSIS — Z91.018: ICD-10-CM

## 2018-11-13 DIAGNOSIS — Z88.1: ICD-10-CM

## 2018-11-13 DIAGNOSIS — Z90.49: ICD-10-CM

## 2018-11-13 DIAGNOSIS — E11.43: ICD-10-CM

## 2018-11-13 DIAGNOSIS — R31.9: ICD-10-CM

## 2018-11-13 DIAGNOSIS — Z87.442: ICD-10-CM

## 2018-11-13 DIAGNOSIS — I10: ICD-10-CM

## 2018-11-13 DIAGNOSIS — Z88.8: ICD-10-CM

## 2018-11-13 DIAGNOSIS — Z96.659: ICD-10-CM

## 2018-11-13 DIAGNOSIS — R10.2: Primary | ICD-10-CM

## 2018-11-13 DIAGNOSIS — K31.84: ICD-10-CM

## 2018-11-13 DIAGNOSIS — G47.30: ICD-10-CM

## 2018-11-13 DIAGNOSIS — Z86.711: ICD-10-CM

## 2018-11-13 DIAGNOSIS — Z90.710: ICD-10-CM

## 2018-11-13 DIAGNOSIS — R79.89: ICD-10-CM

## 2018-11-13 DIAGNOSIS — M79.7: ICD-10-CM

## 2018-11-13 LAB
ALBUMIN SERPL-MCNC: 3.5 G/DL (ref 3.4–5)
ALT SERPL-CCNC: 144 U/L (ref 30–65)
ANION GAP SERPL CALC-SCNC: 7 MMOL/L (ref 7–16)
AST SERPL-CCNC: 100 U/L (ref 15–37)
BACTERIA-REFLEX: (no result) /HPF
BASOPHILS NFR BLD AUTO: 0.5 % (ref 0–2)
BILIRUB SERPL-MCNC: 0.3 MG/DL
BILIRUB UR-MCNC: NEGATIVE MG/DL
BUN SERPL-MCNC: 11 MG/DL (ref 7–18)
CALCIUM SERPL-MCNC: 9.5 MG/DL (ref 8.5–10.1)
CHLORIDE SERPL-SCNC: 103 MMOL/L (ref 98–107)
CO2 SERPL-SCNC: 31 MMOL/L (ref 21–32)
COLOR UR: YELLOW
CREAT SERPL-MCNC: 0.8 MG/DL (ref 0.6–1)
EOSINOPHIL NFR BLD: 2.5 % (ref 0–3)
ERYTHROCYTE [DISTWIDTH] IN BLOOD BY AUTOMATED COUNT: 14.4 % (ref 10.5–14.5)
GLUCOSE SERPL-MCNC: 114 MG/DL (ref 74–106)
GRANULOCYTES NFR BLD MANUAL: 45.3 % (ref 36–66)
HCT VFR BLD CALC: 38.9 % (ref 37–47)
HGB BLD-MCNC: 13.3 GM/DL (ref 12–15)
KETONES UR STRIP-MCNC: NEGATIVE MG/DL
LIPASE: 115 U/L (ref 73–393)
LYMPHOCYTES NFR BLD AUTO: 44.3 % (ref 24–44)
MCH RBC QN AUTO: 31.2 PG (ref 26–34)
MCHC RBC AUTO-ENTMCNC: 34 G/DL (ref 28–37)
MCV RBC: 91.6 FL (ref 80–100)
MONOCYTES NFR BLD: 7.4 % (ref 1–8)
NEUTROPHILS # BLD: 2.4 THOU/UL (ref 1.4–8.2)
PLATELET # BLD: 168 THOU/UL (ref 150–400)
POTASSIUM SERPL-SCNC: 3.5 MMOL/L (ref 3.5–5.1)
PROT SERPL-MCNC: 7.7 G/DL (ref 6.4–8.2)
RBC # BLD AUTO: 4.25 MIL/UL (ref 4.2–5)
RBC # UR STRIP: (no result) /UL
RBC #/AREA URNS HPF: (no result) /HPF (ref 0–2)
SODIUM SERPL-SCNC: 141 MMOL/L (ref 136–145)
SP GR UR STRIP: <= 1.005 (ref 1–1.03)
SQUAMOUS: (no result) /LPF (ref 0–3)
TROPONIN I SERPL-MCNC: <0.06 NG/ML (ref ?–0.06)
URINE CLARITY: CLEAR
URINE GLUCOSE-RANDOM*: NEGATIVE
URINE LEUKOCYTES-REFLEX: (no result)
URINE NITRITE-REFLEX: NEGATIVE
URINE PROTEIN (DIPSTICK): NEGATIVE
UROBILINOGEN UR STRIP-ACNC: 0.2 E.U./DL (ref 0.2–1)
WBC # BLD AUTO: 5.3 THOU/UL (ref 4–11)

## 2019-01-19 ENCOUNTER — HOSPITAL ENCOUNTER (EMERGENCY)
Dept: HOSPITAL 35 - ER | Age: 64
Discharge: HOME | End: 2019-01-19
Payer: COMMERCIAL

## 2019-01-19 VITALS — WEIGHT: 178.99 LBS | BODY MASS INDEX: 30.56 KG/M2 | HEIGHT: 64 IN

## 2019-01-19 VITALS — SYSTOLIC BLOOD PRESSURE: 125 MMHG | DIASTOLIC BLOOD PRESSURE: 56 MMHG

## 2019-01-19 DIAGNOSIS — Z88.8: ICD-10-CM

## 2019-01-19 DIAGNOSIS — R11.2: ICD-10-CM

## 2019-01-19 DIAGNOSIS — Z90.710: ICD-10-CM

## 2019-01-19 DIAGNOSIS — Z96.651: ICD-10-CM

## 2019-01-19 DIAGNOSIS — R19.7: Primary | ICD-10-CM

## 2019-01-19 DIAGNOSIS — N20.0: ICD-10-CM

## 2019-01-19 DIAGNOSIS — M79.7: ICD-10-CM

## 2019-01-19 DIAGNOSIS — E11.9: ICD-10-CM

## 2019-01-19 DIAGNOSIS — Z88.1: ICD-10-CM

## 2019-01-19 DIAGNOSIS — G47.30: ICD-10-CM

## 2019-01-19 DIAGNOSIS — Z90.49: ICD-10-CM

## 2019-01-19 DIAGNOSIS — I10: ICD-10-CM

## 2019-01-19 LAB
ALBUMIN SERPL-MCNC: 3.9 G/DL (ref 3.4–5)
ALT SERPL-CCNC: 50 U/L (ref 30–65)
ANION GAP SERPL CALC-SCNC: 10 MMOL/L (ref 7–16)
AST SERPL-CCNC: 32 U/L (ref 15–37)
BILIRUB SERPL-MCNC: 0.3 MG/DL
BILIRUB UR-MCNC: NEGATIVE MG/DL
BUN SERPL-MCNC: 8 MG/DL (ref 7–18)
CALCIUM OXALATE: (no result) /LPF
CALCIUM SERPL-MCNC: 9.7 MG/DL (ref 8.5–10.1)
CHLORIDE SERPL-SCNC: 105 MMOL/L (ref 98–107)
CO2 SERPL-SCNC: 28 MMOL/L (ref 21–32)
COLOR UR: (no result)
CREAT SERPL-MCNC: 0.8 MG/DL (ref 0.6–1)
ERYTHROCYTE [DISTWIDTH] IN BLOOD BY AUTOMATED COUNT: 14.3 % (ref 10.5–14.5)
GLUCOSE SERPL-MCNC: 107 MG/DL (ref 74–106)
HCT VFR BLD CALC: 44.4 % (ref 37–47)
HGB BLD-MCNC: 15.3 GM/DL (ref 12–15)
KETONES UR STRIP-MCNC: (no result) MG/DL
LIPASE: 181 U/L (ref 73–393)
MCH RBC QN AUTO: 31.1 PG (ref 26–34)
MCHC RBC AUTO-ENTMCNC: 34.5 G/DL (ref 28–37)
MCV RBC: 90.2 FL (ref 80–100)
PLATELET # BLD: 227 THOU/UL (ref 150–400)
POTASSIUM SERPL-SCNC: 3.8 MMOL/L (ref 3.5–5.1)
PROT SERPL-MCNC: 8.2 G/DL (ref 6.4–8.2)
RBC # BLD AUTO: 4.92 MIL/UL (ref 4.2–5)
RBC # UR STRIP: (no result) /UL
RBC #/AREA URNS HPF: (no result) /HPF (ref 0–2)
SODIUM SERPL-SCNC: 143 MMOL/L (ref 136–145)
SP GR UR STRIP: 1.02 (ref 1–1.03)
TROPONIN I SERPL-MCNC: <0.06 NG/ML (ref ?–0.06)
URINE CLARITY: (no result)
URINE GLUCOSE-RANDOM*: NEGATIVE
URINE LEUKOCYTES-REFLEX: NEGATIVE
URINE NITRITE-REFLEX: NEGATIVE
URINE PROTEIN (DIPSTICK): (no result)
URINE WBC-REFLEX: (no result) /HPF (ref 0–5)
UROBILINOGEN UR STRIP-ACNC: 0.2 E.U./DL (ref 0.2–1)
WBC # BLD AUTO: 8.6 THOU/UL (ref 4–11)

## 2019-01-20 ENCOUNTER — HOSPITAL ENCOUNTER (EMERGENCY)
Dept: HOSPITAL 35 - ER | Age: 64
Discharge: HOME | End: 2019-01-20
Payer: COMMERCIAL

## 2019-01-20 VITALS — SYSTOLIC BLOOD PRESSURE: 142 MMHG | DIASTOLIC BLOOD PRESSURE: 65 MMHG

## 2019-01-20 VITALS — BODY MASS INDEX: 29.71 KG/M2 | HEIGHT: 64 IN | WEIGHT: 174.01 LBS

## 2019-01-20 DIAGNOSIS — G47.30: ICD-10-CM

## 2019-01-20 DIAGNOSIS — Z90.710: ICD-10-CM

## 2019-01-20 DIAGNOSIS — E11.9: ICD-10-CM

## 2019-01-20 DIAGNOSIS — I10: ICD-10-CM

## 2019-01-20 DIAGNOSIS — H60.91: Primary | ICD-10-CM

## 2019-01-20 DIAGNOSIS — Z90.49: ICD-10-CM

## 2019-01-20 LAB
BILIRUB UR-MCNC: NEGATIVE MG/DL
COLOR UR: YELLOW
KETONES UR STRIP-MCNC: NEGATIVE MG/DL
MUCUS: (no result) STRN/LPF
RBC # UR STRIP: (no result) /UL
RBC #/AREA URNS HPF: (no result) /HPF (ref 0–2)
SP GR UR STRIP: 1.03 (ref 1–1.03)
SQUAMOUS: (no result) /LPF (ref 0–3)
URINE CLARITY: CLEAR
URINE GLUCOSE-RANDOM*: NEGATIVE
URINE LEUKOCYTES-REFLEX: NEGATIVE
URINE NITRITE-REFLEX: NEGATIVE
URINE PROTEIN (DIPSTICK): NEGATIVE
URINE WBC-REFLEX: (no result) /HPF (ref 0–5)
UROBILINOGEN UR STRIP-ACNC: 0.2 E.U./DL (ref 0.2–1)

## 2019-03-24 ENCOUNTER — HOSPITAL ENCOUNTER (INPATIENT)
Dept: HOSPITAL 35 - ER | Age: 64
LOS: 3 days | Discharge: HOME | DRG: 392 | End: 2019-03-27
Attending: HOSPITALIST | Admitting: HOSPITALIST
Payer: COMMERCIAL

## 2019-03-24 ENCOUNTER — HOSPITAL ENCOUNTER (EMERGENCY)
Dept: HOSPITAL 35 - ER | Age: 64
Discharge: HOME | End: 2019-03-24
Payer: COMMERCIAL

## 2019-03-24 VITALS — BODY MASS INDEX: 29.86 KG/M2 | HEIGHT: 65.98 IN | WEIGHT: 185.8 LBS

## 2019-03-24 VITALS — HEIGHT: 64 IN | BODY MASS INDEX: 30.39 KG/M2 | WEIGHT: 178 LBS

## 2019-03-24 VITALS — SYSTOLIC BLOOD PRESSURE: 156 MMHG | DIASTOLIC BLOOD PRESSURE: 93 MMHG

## 2019-03-24 VITALS — SYSTOLIC BLOOD PRESSURE: 131 MMHG | DIASTOLIC BLOOD PRESSURE: 82 MMHG

## 2019-03-24 VITALS — DIASTOLIC BLOOD PRESSURE: 68 MMHG | SYSTOLIC BLOOD PRESSURE: 168 MMHG

## 2019-03-24 VITALS — DIASTOLIC BLOOD PRESSURE: 70 MMHG | SYSTOLIC BLOOD PRESSURE: 126 MMHG

## 2019-03-24 VITALS — DIASTOLIC BLOOD PRESSURE: 68 MMHG | SYSTOLIC BLOOD PRESSURE: 125 MMHG

## 2019-03-24 VITALS — SYSTOLIC BLOOD PRESSURE: 134 MMHG | DIASTOLIC BLOOD PRESSURE: 66 MMHG

## 2019-03-24 DIAGNOSIS — R74.0: ICD-10-CM

## 2019-03-24 DIAGNOSIS — Z88.8: ICD-10-CM

## 2019-03-24 DIAGNOSIS — Z91.018: ICD-10-CM

## 2019-03-24 DIAGNOSIS — Z87.442: ICD-10-CM

## 2019-03-24 DIAGNOSIS — Z88.1: ICD-10-CM

## 2019-03-24 DIAGNOSIS — Z90.49: ICD-10-CM

## 2019-03-24 DIAGNOSIS — I10: ICD-10-CM

## 2019-03-24 DIAGNOSIS — Z90.710: ICD-10-CM

## 2019-03-24 DIAGNOSIS — Z88.4: ICD-10-CM

## 2019-03-24 DIAGNOSIS — E87.6: ICD-10-CM

## 2019-03-24 DIAGNOSIS — E78.00: ICD-10-CM

## 2019-03-24 DIAGNOSIS — Z86.711: ICD-10-CM

## 2019-03-24 DIAGNOSIS — E11.43: ICD-10-CM

## 2019-03-24 DIAGNOSIS — Z98.84: ICD-10-CM

## 2019-03-24 DIAGNOSIS — R00.1: ICD-10-CM

## 2019-03-24 DIAGNOSIS — Z79.899: ICD-10-CM

## 2019-03-24 DIAGNOSIS — K52.9: Primary | ICD-10-CM

## 2019-03-24 DIAGNOSIS — E11.9: ICD-10-CM

## 2019-03-24 DIAGNOSIS — G47.30: ICD-10-CM

## 2019-03-24 DIAGNOSIS — Z96.659: ICD-10-CM

## 2019-03-24 DIAGNOSIS — M79.7: ICD-10-CM

## 2019-03-24 DIAGNOSIS — Z96.651: ICD-10-CM

## 2019-03-24 DIAGNOSIS — K31.84: ICD-10-CM

## 2019-03-24 LAB
ALBUMIN SERPL-MCNC: 3.1 G/DL (ref 3.4–5)
ALBUMIN SERPL-MCNC: 3.7 G/DL (ref 3.4–5)
ALT SERPL-CCNC: 118 U/L (ref 30–65)
ALT SERPL-CCNC: 98 U/L (ref 30–65)
ANION GAP SERPL CALC-SCNC: 10 MMOL/L (ref 7–16)
ANION GAP SERPL CALC-SCNC: 7 MMOL/L (ref 7–16)
AST SERPL-CCNC: 53 U/L (ref 15–37)
AST SERPL-CCNC: 72 U/L (ref 15–37)
BASOPHILS NFR BLD AUTO: 0.3 % (ref 0–2)
BASOPHILS NFR BLD AUTO: 1 % (ref 0–2)
BILIRUB SERPL-MCNC: 0.2 MG/DL
BILIRUB SERPL-MCNC: 0.5 MG/DL
BILIRUB UR-MCNC: NEGATIVE MG/DL
BUN SERPL-MCNC: 10 MG/DL (ref 7–18)
BUN SERPL-MCNC: 13 MG/DL (ref 7–18)
CALCIUM OXALATE: (no result) /LPF
CALCIUM SERPL-MCNC: 8.8 MG/DL (ref 8.5–10.1)
CALCIUM SERPL-MCNC: 9.7 MG/DL (ref 8.5–10.1)
CHLORIDE SERPL-SCNC: 105 MMOL/L (ref 98–107)
CHLORIDE SERPL-SCNC: 107 MMOL/L (ref 98–107)
CO2 SERPL-SCNC: 29 MMOL/L (ref 21–32)
CO2 SERPL-SCNC: 30 MMOL/L (ref 21–32)
COLOR UR: YELLOW
CREAT SERPL-MCNC: 0.8 MG/DL (ref 0.6–1)
CREAT SERPL-MCNC: 0.8 MG/DL (ref 0.6–1)
EOSINOPHIL NFR BLD: 1.5 % (ref 0–3)
EOSINOPHIL NFR BLD: 1.9 % (ref 0–3)
ERYTHROCYTE [DISTWIDTH] IN BLOOD BY AUTOMATED COUNT: 13.6 % (ref 10.5–14.5)
ERYTHROCYTE [DISTWIDTH] IN BLOOD BY AUTOMATED COUNT: 13.6 % (ref 10.5–14.5)
GLUCOSE SERPL-MCNC: 106 MG/DL (ref 74–106)
GLUCOSE SERPL-MCNC: 98 MG/DL (ref 74–106)
GRANULOCYTES NFR BLD MANUAL: 52 % (ref 36–66)
GRANULOCYTES NFR BLD MANUAL: 63.5 % (ref 36–66)
HCT VFR BLD CALC: 39.4 % (ref 37–47)
HCT VFR BLD CALC: 41.8 % (ref 37–47)
HGB BLD-MCNC: 13.3 GM/DL (ref 12–15)
HGB BLD-MCNC: 14.4 GM/DL (ref 12–15)
KETONES UR STRIP-MCNC: NEGATIVE MG/DL
LIPASE: 107 U/L (ref 73–393)
LYMPHOCYTES NFR BLD AUTO: 27.2 % (ref 24–44)
LYMPHOCYTES NFR BLD AUTO: 38.8 % (ref 24–44)
MCH RBC QN AUTO: 31.3 PG (ref 26–34)
MCH RBC QN AUTO: 31.6 PG (ref 26–34)
MCHC RBC AUTO-ENTMCNC: 33.9 G/DL (ref 28–37)
MCHC RBC AUTO-ENTMCNC: 34.4 G/DL (ref 28–37)
MCV RBC: 91.9 FL (ref 80–100)
MCV RBC: 92.6 FL (ref 80–100)
MONOCYTES NFR BLD: 6.3 % (ref 1–8)
MONOCYTES NFR BLD: 7.5 % (ref 1–8)
NEUTROPHILS # BLD: 3.6 THOU/UL (ref 1.4–8.2)
NEUTROPHILS # BLD: 4 THOU/UL (ref 1.4–8.2)
PLATELET # BLD: 196 THOU/UL (ref 150–400)
PLATELET # BLD: 199 THOU/UL (ref 150–400)
POTASSIUM SERPL-SCNC: 3.5 MMOL/L (ref 3.5–5.1)
POTASSIUM SERPL-SCNC: 3.8 MMOL/L (ref 3.5–5.1)
PROT SERPL-MCNC: 7.1 G/DL (ref 6.4–8.2)
PROT SERPL-MCNC: 7.8 G/DL (ref 6.4–8.2)
RBC # BLD AUTO: 4.26 MIL/UL (ref 4.2–5)
RBC # BLD AUTO: 4.56 MIL/UL (ref 4.2–5)
RBC # UR STRIP: (no result) /UL
RBC #/AREA URNS HPF: (no result) /HPF (ref 0–2)
SODIUM SERPL-SCNC: 144 MMOL/L (ref 136–145)
SODIUM SERPL-SCNC: 144 MMOL/L (ref 136–145)
SP GR UR STRIP: >= 1.03 (ref 1–1.03)
SQUAMOUS: (no result) /LPF (ref 0–3)
URINE CLARITY: CLEAR
URINE GLUCOSE-RANDOM*: NEGATIVE
URINE LEUKOCYTES-REFLEX: NEGATIVE
URINE NITRITE-REFLEX: NEGATIVE
URINE PROTEIN (DIPSTICK): (no result)
URINE WBC-REFLEX: (no result) /HPF (ref 0–5)
UROBILINOGEN UR STRIP-ACNC: 0.2 E.U./DL (ref 0.2–1)
WBC # BLD AUTO: 6.3 THOU/UL (ref 4–11)
WBC # BLD AUTO: 6.9 THOU/UL (ref 4–11)

## 2019-03-24 PROCEDURE — 10045: CPT

## 2019-03-25 VITALS — SYSTOLIC BLOOD PRESSURE: 146 MMHG | DIASTOLIC BLOOD PRESSURE: 67 MMHG

## 2019-03-25 VITALS — DIASTOLIC BLOOD PRESSURE: 96 MMHG | SYSTOLIC BLOOD PRESSURE: 162 MMHG

## 2019-03-25 VITALS — DIASTOLIC BLOOD PRESSURE: 84 MMHG | SYSTOLIC BLOOD PRESSURE: 166 MMHG

## 2019-03-25 VITALS — SYSTOLIC BLOOD PRESSURE: 128 MMHG | DIASTOLIC BLOOD PRESSURE: 73 MMHG

## 2019-03-25 NOTE — NUR
PT A&OX4, VSS. PT HAS PAIN IN ABD AND RATES AT 8 AND CONTROLLED WITH
MEDICATION. PT HAS C/O N/V AND BEING CONTROLLED WITH ZOFRAN. PT HAS BEEN
STABLE IN BED, TELEMETRY MONITORED, NO DIARRHEA. WILL CONTINUE TO MONITOR.

## 2019-03-25 NOTE — NUR
PT ADMITTED RELATED TO NAUSEA VOMINTING ABDOMINAL PAIN. CM REVIEWED CHART AND
SPOKE WITH CARE TEAM. CM MET WITH PT AT BEDSIDE THIS DAY. PT IS A&O X4. CM
ROLE INTRODUCED. PT INDICATED SHE LIVES IN A HOUSE WITH HER SPOUSE WITH 7
STEPS TO ETNER AND 14 STEPS INSIDE. PT INDICATED SHE HAD USED A CANE TO ASSIST
WITH MOBILITY PTA. PT INDICATED SHE HAD HH MAY YEARS AGO. PT INDICATED SHE
PLANS TO RETURN HOME ONCE MEDICALLY STABLE. CM TO FOLLOW AS INDICATED WITH DC
PLANNING.

## 2019-03-25 NOTE — NUR
ADMIT
 
PT ADMITTED WITH ABDOMINAL PAIN RATING IT A 8 OUT OF 10. STARTED 3/24 IN AM
AFTER EATING OUT HAD SEVERE SHARP CRAMPING PAIN NAUSEA AND 4 LOOSE STOOLS AT
HOME , CAME TO ED AND WAS TREATED AND SENT HOME BUT PAIN RETURNED WITH EATING
AND PT RETURNED TO ED. LABS ORDERED, IVF'S INITIATED, VSS TELE INTACT UP AD
VONNIE VOIDING QS, BS HYPERACTIVE AND ABDOMEN TENDER TO TOUCH. TO HAVE ABDOMINAL
AND PELVIC US IN AM CONTINUE POC.

## 2019-03-26 VITALS — DIASTOLIC BLOOD PRESSURE: 79 MMHG | SYSTOLIC BLOOD PRESSURE: 169 MMHG

## 2019-03-26 VITALS — DIASTOLIC BLOOD PRESSURE: 61 MMHG | SYSTOLIC BLOOD PRESSURE: 135 MMHG

## 2019-03-26 VITALS — DIASTOLIC BLOOD PRESSURE: 64 MMHG | SYSTOLIC BLOOD PRESSURE: 162 MMHG

## 2019-03-26 VITALS — DIASTOLIC BLOOD PRESSURE: 72 MMHG | SYSTOLIC BLOOD PRESSURE: 145 MMHG

## 2019-03-26 LAB
ALBUMIN SERPL-MCNC: 3 G/DL (ref 3.4–5)
ALBUMIN SERPL-MCNC: 3.4 G/DL (ref 3.4–5)
ALT SERPL-CCNC: 67 U/L (ref 30–65)
ALT SERPL-CCNC: 70 U/L (ref 30–65)
ANION GAP SERPL CALC-SCNC: 5 MMOL/L (ref 7–16)
AST SERPL-CCNC: 28 U/L (ref 15–37)
AST SERPL-CCNC: 30 U/L (ref 15–37)
BILIRUB DIRECT SERPL-MCNC: < 0.1 MG/DL
BILIRUB SERPL-MCNC: 0.4 MG/DL
BILIRUB SERPL-MCNC: 0.4 MG/DL
BUN SERPL-MCNC: 7 MG/DL (ref 7–18)
CALCIUM SERPL-MCNC: 9 MG/DL (ref 8.5–10.1)
CHLORIDE SERPL-SCNC: 105 MMOL/L (ref 98–107)
CO2 SERPL-SCNC: 30 MMOL/L (ref 21–32)
CREAT SERPL-MCNC: 0.7 MG/DL (ref 0.6–1)
ERYTHROCYTE [DISTWIDTH] IN BLOOD BY AUTOMATED COUNT: 13.2 % (ref 10.5–14.5)
GLUCOSE SERPL-MCNC: 87 MG/DL (ref 74–106)
HCT VFR BLD CALC: 37.1 % (ref 37–47)
HCV AB SER IA-ACNC: <0.1 (ref 0–0.9)
HGB BLD-MCNC: 12.6 GM/DL (ref 12–15)
IRON SERPL-MCNC: 79 UG/DL (ref 50–170)
MCH RBC QN AUTO: 31.5 PG (ref 26–34)
MCHC RBC AUTO-ENTMCNC: 34.1 G/DL (ref 28–37)
MCV RBC: 92.3 FL (ref 80–100)
PLATELET # BLD: 179 THOU/UL (ref 150–400)
POTASSIUM SERPL-SCNC: 3.6 MMOL/L (ref 3.5–5.1)
PROT SERPL-MCNC: 6 G/DL (ref 6.4–8.2)
PROT SERPL-MCNC: 6.4 G/DL (ref 6.4–8.2)
RBC # BLD AUTO: 4.02 MIL/UL (ref 4.2–5)
SAO2 % BLD FROM PO2: 25 % (ref 20–39)
SODIUM SERPL-SCNC: 140 MMOL/L (ref 136–145)
TIBC SERPL-MCNC: 312 UG/DL (ref 250–450)
WBC # BLD AUTO: 5.8 THOU/UL (ref 4–11)

## 2019-03-26 NOTE — NUR
PATIENT AOX4 MAKES NEEDS KNOWN. PAIN AND NAUSEA CONTROLLED THIS SHIFT. NO
EMESIS THIS SHIFT. PATIENT HAD APPLE JUICE THIS SHIFT D/T BLOOD SUGAR BEING
72, PATIENT TOLERATED APPLE JUICE.PATIENT IS BRADYCARDIC ON TELE, PATIENT IS
NOT SYMPTOMATIC.  FALL PRECAUTION IN PLACE.PATIENT IN BED ASLEEP AT THIS TIME
BREATHING REGULAR AND UNLABOURED.

## 2019-03-26 NOTE — NUR
PT A&OX4, VSS. PAIN IN ABD AND RATES AT AN 8. PAIN BEING MANAGED WITH
MEDICATION. NO N/V/D, PT HAS HAD A FORMED BM, NO BLOOD IN STOOL. PT DIET HAS
BEEN ADVANCED TO CLEAR LIQUIDS AND IS TOLERATING WELL. IV ABX HAVE BEEN GIVEN.
PT HAS NO OTHER CONCERNS BESIDES ABD. PAIN. WILL CONTINUE TO MONITOR.

## 2019-03-27 VITALS — SYSTOLIC BLOOD PRESSURE: 131 MMHG | DIASTOLIC BLOOD PRESSURE: 59 MMHG

## 2019-03-27 VITALS — SYSTOLIC BLOOD PRESSURE: 142 MMHG | DIASTOLIC BLOOD PRESSURE: 70 MMHG

## 2019-03-27 VITALS — DIASTOLIC BLOOD PRESSURE: 59 MMHG | SYSTOLIC BLOOD PRESSURE: 131 MMHG

## 2019-03-27 LAB
ALBUMIN SERPL-MCNC: 3.1 G/DL (ref 3.4–5)
ALT SERPL-CCNC: 56 U/L (ref 30–65)
ANION GAP SERPL CALC-SCNC: 7 MMOL/L (ref 7–16)
AST SERPL-CCNC: 24 U/L (ref 15–37)
BILIRUB DIRECT SERPL-MCNC: 0.1 MG/DL
BILIRUB SERPL-MCNC: 0.4 MG/DL
BUN SERPL-MCNC: 6 MG/DL (ref 7–18)
CALCIUM SERPL-MCNC: 8.9 MG/DL (ref 8.5–10.1)
CERULOPLASMIN SERPL-MCNC: 18.7 MG/DL (ref 19–39)
CHLORIDE SERPL-SCNC: 105 MMOL/L (ref 98–107)
CO2 SERPL-SCNC: 29 MMOL/L (ref 21–32)
CREAT SERPL-MCNC: 0.7 MG/DL (ref 0.6–1)
ERYTHROCYTE [DISTWIDTH] IN BLOOD BY AUTOMATED COUNT: 13.3 % (ref 10.5–14.5)
GLUCOSE SERPL-MCNC: 85 MG/DL (ref 74–106)
HCT VFR BLD CALC: 36.3 % (ref 37–47)
HGB BLD-MCNC: 12.6 GM/DL (ref 12–15)
MCH RBC QN AUTO: 31.8 PG (ref 26–34)
MCHC RBC AUTO-ENTMCNC: 34.8 G/DL (ref 28–37)
MCV RBC: 91.2 FL (ref 80–100)
PLATELET # BLD: 172 THOU/UL (ref 150–400)
POTASSIUM SERPL-SCNC: 3.1 MMOL/L (ref 3.5–5.1)
PROT SERPL-MCNC: 6.4 G/DL (ref 6.4–8.2)
RBC # BLD AUTO: 3.97 MIL/UL (ref 4.2–5)
SODIUM SERPL-SCNC: 141 MMOL/L (ref 136–145)
WBC # BLD AUTO: 5 THOU/UL (ref 4–11)

## 2019-03-27 NOTE — NUR
PT ALERT/ORIENTED X4,  UP ADLIB,  VOIDING PER BSC WITH NO ASSIST,  REMAINS
CARYN IN THE 40'S,  PT SAID THIS IS HER BASELINE FOR OVER 15 YEARS,  IS
ASSYMPTOMATIC,  CONTINUE TO HAVE ABDOMINAL PAIN, RELIEVED BY MORPHINE,
REQUESTING ZOFRAN EACH TIME SHE TAKES MORPHINE,  ON ROOM AIR, NO SOB NOTED, BM
LAST NOC, REMAINS ON CLEAR LIQUID DIET,  FSBS,  SCDS TO BLE,  USING CALL LIGHT
APPROPRIATELY, HOURLY ROUNDING, MONITORED.

## 2019-03-27 NOTE — NUR
PT STABLE THROUGHOUT SHIFT. PT DISCHARGED HOME, GIVEN RX'S, DISCHARGE
INSTRUCTIONS. PT LEFT UNIT VIA WHEELCHAIR TO PRIVATE VEHICLE.

## 2019-03-28 LAB
MITOCHONDRIAL ANTIBODY: <20 UNITS (ref 0–20)
SMOOTH MUSCLE ANTIBODY: 6 UNITS (ref 0–19)

## 2019-09-21 ENCOUNTER — HOSPITAL ENCOUNTER (EMERGENCY)
Dept: HOSPITAL 35 - ER | Age: 64
Discharge: HOME | End: 2019-09-21
Payer: COMMERCIAL

## 2019-09-21 VITALS — DIASTOLIC BLOOD PRESSURE: 94 MMHG | SYSTOLIC BLOOD PRESSURE: 148 MMHG

## 2019-09-21 VITALS — BODY MASS INDEX: 28.68 KG/M2 | HEIGHT: 64 IN | WEIGHT: 167.99 LBS

## 2019-09-21 DIAGNOSIS — M19.90: ICD-10-CM
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
## 2019-09-21 DIAGNOSIS — M79.7: ICD-10-CM

## 2019-09-21 DIAGNOSIS — G47.30: ICD-10-CM

## 2019-09-21 DIAGNOSIS — Z98.84: ICD-10-CM

## 2019-09-21 DIAGNOSIS — Z86.711: ICD-10-CM
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
## 2019-09-21 DIAGNOSIS — Z88.6: ICD-10-CM

## 2019-09-21 DIAGNOSIS — Z90.710: ICD-10-CM

## 2019-09-21 DIAGNOSIS — E11.43: ICD-10-CM

## 2019-09-21 DIAGNOSIS — Z88.8: ICD-10-CM
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
## 2019-09-21 DIAGNOSIS — Z88.5: ICD-10-CM

## 2019-09-21 DIAGNOSIS — I10: ICD-10-CM

## 2019-09-21 DIAGNOSIS — K31.84: ICD-10-CM

## 2019-09-21 DIAGNOSIS — Z98.890: ICD-10-CM

## 2019-09-21 DIAGNOSIS — Z96.653: ICD-10-CM

## 2019-09-21 DIAGNOSIS — S46.911A: Primary | ICD-10-CM

## 2019-09-21 DIAGNOSIS — Z87.442: ICD-10-CM

## 2019-09-21 DIAGNOSIS — Z88.1: ICD-10-CM

## 2019-09-21 DIAGNOSIS — G44.209: ICD-10-CM

## 2019-09-21 DIAGNOSIS — Z90.49: ICD-10-CM

## 2019-09-21 DIAGNOSIS — Z91.018: ICD-10-CM

## 2020-11-05 ENCOUNTER — HOSPITAL ENCOUNTER (EMERGENCY)
Dept: HOSPITAL 35 - ER | Age: 65
Discharge: HOME | End: 2020-11-05
Payer: COMMERCIAL

## 2020-11-05 VITALS — SYSTOLIC BLOOD PRESSURE: 144 MMHG | DIASTOLIC BLOOD PRESSURE: 76 MMHG

## 2020-11-05 VITALS — HEIGHT: 64 IN | BODY MASS INDEX: 29.71 KG/M2 | WEIGHT: 174.01 LBS

## 2020-11-05 DIAGNOSIS — E11.9: ICD-10-CM

## 2020-11-05 DIAGNOSIS — Z88.1: ICD-10-CM

## 2020-11-05 DIAGNOSIS — Z98.84: ICD-10-CM

## 2020-11-05 DIAGNOSIS — R07.89: ICD-10-CM

## 2020-11-05 DIAGNOSIS — H92.09: ICD-10-CM

## 2020-11-05 DIAGNOSIS — M19.90: ICD-10-CM

## 2020-11-05 DIAGNOSIS — Z90.711: ICD-10-CM

## 2020-11-05 DIAGNOSIS — Z88.8: ICD-10-CM

## 2020-11-05 DIAGNOSIS — R51.9: ICD-10-CM

## 2020-11-05 DIAGNOSIS — I10: Primary | ICD-10-CM

## 2020-11-05 DIAGNOSIS — Z79.899: ICD-10-CM

## 2020-11-05 DIAGNOSIS — Z90.49: ICD-10-CM

## 2020-11-05 DIAGNOSIS — Z91.018: ICD-10-CM

## 2020-11-05 DIAGNOSIS — Z96.653: ICD-10-CM

## 2020-11-05 DIAGNOSIS — M79.7: ICD-10-CM

## 2020-11-05 DIAGNOSIS — Z87.442: ICD-10-CM

## 2020-11-05 DIAGNOSIS — R42: ICD-10-CM

## 2020-11-05 LAB
ALBUMIN SERPL-MCNC: 4.2 G/DL (ref 3.4–5)
ALT SERPL-CCNC: 18 U/L (ref 30–65)
ANION GAP SERPL CALC-SCNC: 12 MMOL/L (ref 7–16)
AST SERPL-CCNC: 19 U/L (ref 15–37)
BASOPHILS NFR BLD AUTO: 0.8 % (ref 0–2)
BILIRUB DIRECT SERPL-MCNC: 0.1 MG/DL
BILIRUB SERPL-MCNC: 0.4 MG/DL (ref 0.2–1)
BUN SERPL-MCNC: 11 MG/DL (ref 7–18)
CALCIUM SERPL-MCNC: 9.6 MG/DL (ref 8.5–10.1)
CHLORIDE SERPL-SCNC: 104 MMOL/L (ref 98–107)
CO2 SERPL-SCNC: 27 MMOL/L (ref 21–32)
CREAT SERPL-MCNC: 1.1 MG/DL (ref 0.6–1)
EOSINOPHIL NFR BLD: 1 % (ref 0–3)
ERYTHROCYTE [DISTWIDTH] IN BLOOD BY AUTOMATED COUNT: 14.4 % (ref 10.5–14.5)
GLUCOSE SERPL-MCNC: 123 MG/DL (ref 74–106)
GRANULOCYTES NFR BLD MANUAL: 53.8 % (ref 36–66)
HCT VFR BLD CALC: 46.1 % (ref 37–47)
HGB BLD-MCNC: 15.3 GM/DL (ref 12–15)
LYMPHOCYTES NFR BLD AUTO: 37.3 % (ref 24–44)
MCH RBC QN AUTO: 30.1 PG (ref 26–34)
MCHC RBC AUTO-ENTMCNC: 33.3 G/DL (ref 28–37)
MCV RBC: 90.4 FL (ref 80–100)
MONOCYTES NFR BLD: 7.1 % (ref 1–8)
NEUTROPHILS # BLD: 3.7 THOU/UL (ref 1.4–8.2)
PLATELET # BLD: 252 THOU/UL (ref 150–400)
POTASSIUM SERPL-SCNC: 3.7 MMOL/L (ref 3.5–5.1)
PROT SERPL-MCNC: 8.7 G/DL (ref 6.4–8.2)
RBC # BLD AUTO: 5.09 MIL/UL (ref 4.2–5)
SODIUM SERPL-SCNC: 143 MMOL/L (ref 136–145)
TROPONIN I SERPL-MCNC: <0.06 NG/ML (ref ?–0.06)
WBC # BLD AUTO: 6.8 THOU/UL (ref 4–11)

## 2020-11-05 NOTE — EKG
[facility]
[place]   [address]                     ELECTROCARDIOGRAM REPORT      
_______________________________________________________________________________
 
Name:       ILEANA ELIZALDE          Room #:                     REG [facility]#:      [id number]                       Account #:      [id number]  
Admission:  20    Attend Phys:                          
Discharge:              Date of Birth:  [birth year]  
                                                          Report #: [id number]
                                                                    [id number]
_______________________________________________________________________________
THIS REPORT FOR:  
 
cc:  EDSON FRANCIS                
     Physician not on staff        
     Shane Sampson MD [facility]                                         ~
THIS REPORT FOR:   //name//                          
 
                         [facility] ED
                                       
Test Date:    2020               Test Time:    06:53:39
Pat Name:     ILEANA ELIZALDE             Department:   
Patient ID:   [id number]            Room:          
Gender:       F                        Technician:   marshall
:          1955               Requested By: Rayna Carrera
Order Number: [id number] MD:   Shane Sampson
                                 Measurements
Intervals                              Axis          
Rate:         55                       P:            14
IA:           153                      QRS:          51
QRSD:         89                       T:            -2
QT:           440                                    
QTc:          421                                    
                           Interpretive Statements
Sinus rhythm
Probable left atrial enlargement
Borderline T abnormalities, inferior leads
Baseline wander in lead(s) V1
Compared to ECG 2019 11:04:13
T-wave abnormality now present
Sinus bradycardia no longer present
Electronically Signed On 2020 7:38:42 CST by Shane Sampson
[email or web address]
 
 
 
 
 
 
 
 
 
 
 
 
  <ELECTRONICALLY SIGNED>
   By: Shane Sampson MD, [facility]    
  20     0738
D: 20 0653                           _____________________________________
T: 20 0653                           Shane Sampson MD, [facility]      /EPI

## 2021-02-02 ENCOUNTER — HOSPITAL ENCOUNTER (INPATIENT)
Dept: HOSPITAL 35 - ER | Age: 66
LOS: 3 days | Discharge: HOME HEALTH | DRG: 556 | End: 2021-02-05
Attending: HOSPITALIST | Admitting: HOSPITALIST
Payer: COMMERCIAL

## 2021-02-02 VITALS — WEIGHT: 174 LBS | BODY MASS INDEX: 29.71 KG/M2 | HEIGHT: 64.02 IN

## 2021-02-02 VITALS — SYSTOLIC BLOOD PRESSURE: 103 MMHG | DIASTOLIC BLOOD PRESSURE: 53 MMHG

## 2021-02-02 VITALS — DIASTOLIC BLOOD PRESSURE: 61 MMHG | SYSTOLIC BLOOD PRESSURE: 120 MMHG

## 2021-02-02 VITALS — SYSTOLIC BLOOD PRESSURE: 144 MMHG | DIASTOLIC BLOOD PRESSURE: 57 MMHG

## 2021-02-02 VITALS — DIASTOLIC BLOOD PRESSURE: 66 MMHG | SYSTOLIC BLOOD PRESSURE: 126 MMHG

## 2021-02-02 VITALS — SYSTOLIC BLOOD PRESSURE: 135 MMHG | DIASTOLIC BLOOD PRESSURE: 64 MMHG

## 2021-02-02 DIAGNOSIS — Z98.84: ICD-10-CM

## 2021-02-02 DIAGNOSIS — M19.90: ICD-10-CM

## 2021-02-02 DIAGNOSIS — Z86.711: ICD-10-CM
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
## 2021-02-02 DIAGNOSIS — Z91.018: ICD-10-CM

## 2021-02-02 DIAGNOSIS — Z90.710: ICD-10-CM

## 2021-02-02 DIAGNOSIS — I10: ICD-10-CM

## 2021-02-02 DIAGNOSIS — E66.9: ICD-10-CM

## 2021-02-02 DIAGNOSIS — S09.90XA: ICD-10-CM

## 2021-02-02 DIAGNOSIS — Z88.1: ICD-10-CM

## 2021-02-02 DIAGNOSIS — M79.7: Primary | ICD-10-CM

## 2021-02-02 DIAGNOSIS — Z96.653: ICD-10-CM

## 2021-02-02 DIAGNOSIS — G89.4: ICD-10-CM
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
## 2021-02-02 DIAGNOSIS — R55: ICD-10-CM

## 2021-02-02 DIAGNOSIS — E11.9: ICD-10-CM

## 2021-02-02 DIAGNOSIS — F07.81: ICD-10-CM

## 2021-02-02 DIAGNOSIS — M54.9: ICD-10-CM

## 2021-02-02 DIAGNOSIS — Z88.8: ICD-10-CM

## 2021-02-02 DIAGNOSIS — Z87.442: ICD-10-CM
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
## 2021-02-02 DIAGNOSIS — D68.59: ICD-10-CM

## 2021-02-02 LAB
ALBUMIN SERPL-MCNC: 3.9 G/DL (ref 3.4–5)
ALT SERPL-CCNC: 27 U/L (ref 14–59)
ANION GAP SERPL CALC-SCNC: 11 MMOL/L (ref 7–16)
AST SERPL-CCNC: 15 U/L (ref 15–37)
BASOPHILS NFR BLD AUTO: 0.2 % (ref 0–2)
BILIRUB SERPL-MCNC: 0.4 MG/DL (ref 0.2–1)
BUN SERPL-MCNC: 11 MG/DL (ref 7–18)
CALCIUM SERPL-MCNC: 9.6 MG/DL (ref 8.5–10.1)
CHLORIDE SERPL-SCNC: 103 MMOL/L (ref 98–107)
CO2 SERPL-SCNC: 28 MMOL/L (ref 21–32)
CREAT SERPL-MCNC: 0.9 MG/DL (ref 0.6–1)
EOSINOPHIL NFR BLD: 0.3 % (ref 0–3)
ERYTHROCYTE [DISTWIDTH] IN BLOOD BY AUTOMATED COUNT: 13.8 % (ref 10.5–14.5)
GLUCOSE SERPL-MCNC: 112 MG/DL (ref 74–106)
GRANULOCYTES NFR BLD MANUAL: 60.3 % (ref 36–66)
HCT VFR BLD CALC: 41.6 % (ref 37–47)
HGB BLD-MCNC: 13.8 GM/DL (ref 12–15)
LYMPHOCYTES NFR BLD AUTO: 32.9 % (ref 24–44)
MCH RBC QN AUTO: 30.2 PG (ref 26–34)
MCHC RBC AUTO-ENTMCNC: 33.2 G/DL (ref 28–37)
MCV RBC: 91.2 FL (ref 80–100)
MONOCYTES NFR BLD: 6.3 % (ref 1–8)
NEUTROPHILS # BLD: 3.5 THOU/UL (ref 1.4–8.2)
PLATELET # BLD: 221 THOU/UL (ref 150–400)
POTASSIUM SERPL-SCNC: 3.6 MMOL/L (ref 3.5–5.1)
PROT SERPL-MCNC: 7.9 G/DL (ref 6.4–8.2)
RBC # BLD AUTO: 4.56 MIL/UL (ref 4.2–5)
SODIUM SERPL-SCNC: 142 MMOL/L (ref 136–145)
TROPONIN I SERPL-MCNC: <0.06 NG/ML (ref ?–0.06)
WBC # BLD AUTO: 5.9 THOU/UL (ref 4–11)

## 2021-02-02 PROCEDURE — 10100: CPT

## 2021-02-02 PROCEDURE — 10045: CPT

## 2021-02-02 NOTE — NUR
Admitted from the ER due to fall, syncope episode at home.
Admission assessment, education and history. Admission forms signed.
On room air. Vital signs stable. On telemetry; no complains and signs of chest
pain, crushing sensation and heaviness. Falls bundle in place.
On regular diet- tolerating well; no nausea, no vomiting and no abdominal pain
noted.
Continent of bowel and bladder, able to go to the toilet with standby assist,
gait belt. Falls bundle in place.
With SL at R AC, NS at 75cc/hr resumed as ordered.
EEG done at bedside. Pt for MRI tomorrow, pt requesting to be pre medicated
prior to procedure as she is claustrophobic- Dr Brunson informed, Night RN
informed as well; a/w orders.
Med rec done at ER, Dr Solis informed and aware.
Pt informed re: visitation policy, wants her  to be her designated
visitor; acknowledged understanding that no changes can be made afterwards and
re: visitation times.
Complained of pain and nausea, PRN medications given as prescribed.
To continue monitoring patient.

## 2021-02-02 NOTE — EKG
[place] [address] [facility]
[place]  [address]
Phone:  [phone]                    ELECTROCARDIOGRAM REPORT      
_______________________________________________________________________________
 
Name:       ILEANA ELIZALDE          [id number]     Account #:      [id number]  
Admission:  21    Attend Phys:                          
Discharge:              Date of Birth:  [birth year]  
                                                          Report #: [id number]
   [id number]
_______________________________________________________________________________
                         [facility] ED
                                       
Test Date:    2021               Test Time:    11:04:11
Pat Name:     ILEANA ELIZALDE             Department:   
Patient ID:   [id number]            Room:          
Gender:       F                        Technician:   ABDIEL
:          [id number]               Requested By: Shagufta Lopez
Order Number: [id number] MD:   Shane Sampson
                                 Measurements
Intervals                              Axis          
Rate:         54                       P:            13
RI:           167                      QRS:          33
QRSD:         100                      T:            -1
QT:           425                                    
QTc:          403                                    
                           Interpretive Statements
Sinus rhythm
Probable left atrial enlargement
Compared to ECG 2020 06:53:39
T-wave abnormality no longer present
Electronically Signed On 2021 11:37:21 CST by Shane Sampson
[email or web address]
 
 
 
 
 
 
 
 
 
 
 
 
 
 
 
 
 
 
 
 
 
  <ELECTRONICALLY SIGNED>
   By: Shane Sampson MD, [facility]    
  21     1137
D: 21 1104                           _____________________________________
T: 21 1104                           Shane Sampson MD, FACC      /EPI

## 2021-02-03 VITALS — DIASTOLIC BLOOD PRESSURE: 63 MMHG | SYSTOLIC BLOOD PRESSURE: 113 MMHG

## 2021-02-03 VITALS — SYSTOLIC BLOOD PRESSURE: 113 MMHG | DIASTOLIC BLOOD PRESSURE: 59 MMHG

## 2021-02-03 VITALS — SYSTOLIC BLOOD PRESSURE: 115 MMHG | DIASTOLIC BLOOD PRESSURE: 47 MMHG

## 2021-02-03 VITALS — DIASTOLIC BLOOD PRESSURE: 47 MMHG | SYSTOLIC BLOOD PRESSURE: 115 MMHG

## 2021-02-03 VITALS — DIASTOLIC BLOOD PRESSURE: 63 MMHG | SYSTOLIC BLOOD PRESSURE: 146 MMHG

## 2021-02-03 VITALS — SYSTOLIC BLOOD PRESSURE: 123 MMHG | DIASTOLIC BLOOD PRESSURE: 61 MMHG

## 2021-02-03 LAB
ANION GAP SERPL CALC-SCNC: 10 MMOL/L (ref 7–16)
BUN SERPL-MCNC: 10 MG/DL (ref 7–18)
CALCIUM SERPL-MCNC: 8.4 MG/DL (ref 8.5–10.1)
CHLORIDE SERPL-SCNC: 105 MMOL/L (ref 98–107)
CO2 SERPL-SCNC: 27 MMOL/L (ref 21–32)
CREAT SERPL-MCNC: 0.9 MG/DL (ref 0.6–1)
GLUCOSE SERPL-MCNC: 87 MG/DL (ref 74–106)
POTASSIUM SERPL-SCNC: 3.5 MMOL/L (ref 3.5–5.1)
SODIUM SERPL-SCNC: 142 MMOL/L (ref 136–145)
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
## 2021-02-03 NOTE — NUR
Assumed pt care at 1900. A/OX4,VSS. C/o generalized aches/headache medicated
per EMAR with some relief reported. Up with SBA to BR. IVF infusing via RAC
w/o any problems voiced. Sinus raquel on telemetry, not asymptomatic. Resting
w/o any distress noted. Fall precautions in place,calls approp for help. Will
continue to monitor pt.

## 2021-02-03 NOTE — NUR
ASSUMED CARE OF PATIENT AFTER REPORT. ASSESSMENT AS CHARTED. MEDS GIVEN PER
EMAR. VSS. PATIENT IS A&OX4 AND MAKES NEEDS KNOWN, UP SBA D/T PREVIOUS FALLS
AND DIZZINESS. PATIENT DID "SLIP" FROM THE TOILET WHILST HAVING AN EPISODE OF
NAUSEA. FALL PROTOCOL FOLLOWED. NAUSEA STILL NOT RELEIEVED; PROVIDER ORDERED
STRONGER ANTIEMETIC. FLUIDS INFUISNG ON R AC W NO ISSUES. PATIENT REQUESTED
FOR OMEPRAZOLE TO BE RESUMED; PROVIDER NOTIFIED. FALL PRECAUTIONS REMAIN IN
PLACE. PATIENT VOICED NO OTHER NEEDS AT THIS TIME. WILL CONTINUE TO MONITOR

## 2021-02-04 VITALS — DIASTOLIC BLOOD PRESSURE: 61 MMHG | SYSTOLIC BLOOD PRESSURE: 125 MMHG

## 2021-02-04 VITALS — DIASTOLIC BLOOD PRESSURE: 80 MMHG | SYSTOLIC BLOOD PRESSURE: 142 MMHG

## 2021-02-04 VITALS — DIASTOLIC BLOOD PRESSURE: 76 MMHG | SYSTOLIC BLOOD PRESSURE: 133 MMHG

## 2021-02-04 VITALS — DIASTOLIC BLOOD PRESSURE: 73 MMHG | SYSTOLIC BLOOD PRESSURE: 139 MMHG

## 2021-02-04 NOTE — NUR
Assumed pt care this am, VS stable. Pain is managed with medications, partial
relief is noted. FAll precautions in place, report given to 4 south pt moved
to 440.

## 2021-02-04 NOTE — NUR
PT ADMITTED RELATED TO SYNCOPE. CM REVIEWED CHART AND SPOKE WITH CARE TEAM. CM
SPOKE WITH PT THIS DAY. PT INDICATED THAT SHE RESIDES IN A TRI-LEVEL HOUSE
WITH HER SPOUSE WITH 3 TO ENTER HOUSE FROM FRONT, 7 FROM THE GARAGE, AND 7 TO
BEDROOM LEVEL. PT INDICATED SHE HAD BEEN INDEPDNENET WITH GAIT AND ADLS PTA.
PT INDICATED NO DME USAGE RECENTLY BUT THAT SHE HAS 2 WALKERS AND 2 CANES AT
HOME. PT INDICATED SHE IS ALONE IN HOUSE FROM 1-10 AS SPOUSE IS . SHE
INDICATED SHE PLANS TO DC HOME ONCE MEDICALLY STABLE. PT INDICATED HE FRIEND
SHELBI SCHROEDER [phone] WORKS FOR A HH PROVIDER AND SHE WANTS TO USE THAT
COMPOANY. CM SPOKE WITH SHELBI AND SHE WORKS FOR [employer]. CM FAXED
REFERRAL TO THEM FOR SERVICES UPON ANTICAPTED DC TOMORROW. PT INDICATED SHE
NEEDS TO LEAVE BY 12. CM TO FOLLOW AS INDICATED WITH DC PLANNING.

## 2021-02-04 NOTE — NUR
ASSUMED CARE OF PT AT 1900HRS. PT IS AOX4 AND LETS NEEDS BE KNOWN. FALL
PRECAUTION IN PLACE. NEURO WAS INTACT UPON ASSESSMENT. PT COMPLAINED OF PAIN
AND NAUSEA; PRNS GIVEN. PT RAN SR/SB ON TELE. ASSESSMENT CHARTED. PT WAS ABLE
TO GET COMFORTABLE AND SLEEP PART OF THE SHIFT. VSS AND NO S/S OF ACUTE
DISTRESS. WILL CONTINUE TO MONITOR.

## 2021-02-05 VITALS — DIASTOLIC BLOOD PRESSURE: 68 MMHG | SYSTOLIC BLOOD PRESSURE: 133 MMHG

## 2021-02-05 VITALS — SYSTOLIC BLOOD PRESSURE: 133 MMHG | DIASTOLIC BLOOD PRESSURE: 68 MMHG

## 2021-02-05 VITALS — SYSTOLIC BLOOD PRESSURE: 130 MMHG | DIASTOLIC BLOOD PRESSURE: 67 MMHG

## 2021-02-05 NOTE — NUR
CM EXPLAINED TO PT DUSTIN DESAI WILL CONTACT HER EITHER TODAY OR TOMORROW TO
ARRNAGE VISIT. PT AGREEABLE TO PLAN.
 
DC PLANNER AGREED TO FAX ORDERS TO [facility].

## 2021-02-05 NOTE — NUR
JACEY NP STATED THAT SHE CALLED IN FLEXERIL TO [facility] AT [place], [place]. PT VERBALY
UNDERSTOOD D/C ORDERS. PT LEFT VIA W/C TO CAR.  DRIVING.

## 2021-02-05 NOTE — NUR
ADM LORAZEPAM 0.5MG IV FOR PRE-MEDICATION FOR MRI, PT HAS HX OF
CLAUSTROPHOBIA. PT DENIES ANY DIZZINESS WHEN GETTING UP THIS AM. PT STATED
PAIN IS 5 ON 1-10 SCALE TO LEFT SIDE OF HEAD. PT STATED SHE HAS SLIGHT
BLURRINESS TO LEFT SIDE OF LEFT EYE. PT STATED SHE HIT HER HEAD ON THE CORNER
OF THE WALL AT HOME. PT STATED SHE HAS CHRONIC PAIN DUE TO FIBROMYALGIA AND
OS. PT STATED NORMALY SHE HAS LOW PULSE AND BP.

## 2021-02-05 NOTE — NUR
Assumed pt care at 1900. Pt is alert and oriented. No sign of distress noted
in pt. Pt continues to verbalize pain. Fall precaution in place. Assessment
completed and documented. Pt is stable. Vital signs stable. Scheduled meds
administered to pt. Pain med administered upon request. No acute events
overnight. Continue to monitor. No further needs at this time.

## 2021-02-05 NOTE — NUR
PT UP WITH STEADY GAIT TO BATHROOM. PT WANTING TO GET CLEANED UP BEFORE MRI
THIS AM. NO SIGNS OF DIZZINESS WHEN GETTING FROM BED TO STANDING POSITION.

## 2021-02-05 NOTE — NUR
PT REQUESTING PAIN MEDICATION DUE TO GENERALIZED PAIN OF 7 ON 1-10 SCALE. ADM
PERCOCET ONE TAB PO FOR PAIN.

## 2021-02-10 NOTE — EEG
[facility]
[address]
[place]  [address]                      ELECTROENCEPHALOGRAM          
_______________________________________________________________________________
 
Name:       ILEANA ELIZALDE           Room #:         440-P       [facility] [place]  
M.R.#:      [id number]      Account #:      [id number]  
Admission:  02/02/21     Attend Phys:    Willi Solis MD   
Discharge:  02/05/21     Date of Birth:  [birth year]  
                                                           Report #: [id number]
    [id number]  
_______________________________________________________________________________
THIS REPORT FOR:   //name//                          
 
DATE OF SERVICE:  02/02/2021
 
 
This patient is being evaluated for the possibility of syncope.  EEG was done by
placing the electrode by standard 10-20 system of electrode placement.  Both
referential and sequential montages were used for recording.  Background
activity in this patient's EEG is about 10 Hz and 50 microvolts.  The patient
became drowsy that is associated with bilateral slowing.  Photic stimulation is
unremarkable.  Throughout the record, no active epileptiform activity was
noticed.
 
IMPRESSION:  This patient's EEG is within normal limit.
 
Thank you very much for this referral.
 
 
 
 
 
 
 
 
 
 
 
 
 
 
 
 
 
 
 
 
 
 
 
 
 
 
 
 
       <ELECTRONICALLY SIGNED>
   By: Kyler Brunson MD         
  02/10/21     1454
D: 02/02/21 1611                           _____________________________________
T: 02/02/21 1624                           Kyler Brunson MD           /nt

## 2021-02-10 NOTE — HC
[facility] [place]
[address]
[place]   [address]                     CONSULTATION                  
_______________________________________________________________________________
 
Name:       ILEANA ELIZALDE          Room #:         440-P       [facility] [place]  
M.R.#:      [id number]                       Account #:      [id number]  
Admission:  02/02/21    Attend Phys:    Willi Solis MD    
Discharge:  02/05/21    Date of Birth:  [birth year]  
                                                          Report #: [id number]
                                                                    [id number]   
_______________________________________________________________________________
THIS REPORT FOR:  
 
cc:  EDSON FRANCIS                
     Physician not on staff                                               
     Kyler Brunson MD                                         ~
 
DATE OF SERVICE:  02/02/2021
 
 
HISTORY OF PRESENT ILLNESS:  This is a 65-year-old female patient who was
evaluated by me for the possibility of seizure.  This patient is on
anticoagulation.  She said she had pulmonary embolus twice several years apart. 
She has a Granby filter.  She was prescribed anticoagulation after her
second pulmonary embolus.  She has no family history of hypercoagulable
disorder.  She bumped her head yesterday.  She was complaining of some headache
and some visual blurring on the left eye.  She was seen in the Emergency Room
and that note was reviewed.  Subsequently, she was sent home.  She came back
today because she said that she passed out.  She has some headache, she has some
dizziness and she was out just for a few seconds and she did not have any
significant trauma on the passing out spell today.
 
REVIEW OF SYSTEMS:  Pretty extensive.  It will appear that this patient has been
to this hospital multiple times.  She has seen multiple consultants including
pain management.  She told me she did not have any history of seizures before. 
However, during the review of the records, I came across a consultation from Dr. Arriola who was a neurologist here and he evaluated this patient for seizure.  His
consult note indicates that she was previously evaluated by research neurologist
for any possibility of seizure.  He started on Keppra, but the patient tells me
she is not on any seizure medication.  Record confirmed that.  I need to go back
and talk to her about that.  She has a history of multiple joint problem.  She
is on anticoagulation with Eliquis.  She is disabled because of multiple medical
conditions she has.  She does have a plate in her neck.  She was having a
pinched nerve according to her.  She also has some rotator cuff.  When rotator
cuff surgery did not take care of her problem then she had surgery of the spine.
 She also had significant problem with kidney stones.  She has sleep apnea.  She
has a history of fibromyalgia.  She has sickle cell trait.  She had a gastric
bypass surgery according to her and she did lose weight.  That was a relevant
14-point review of system.
 
PAST MEDICAL HISTORY:  Looks like positive for at least seizure-like episodes
for which she saw a neurologist here.
 
FAMILY HISTORY:  Unremarkable.  Negative for hypercoagulable disorder.
 
SOCIAL HISTORY:  She denies the use of any alcohol or smoking.
 
 
 
 
[address]   [address]                     CONSULTATION                  
_______________________________________________________________________________
 
Name:       ILEANA ELIZALDE          Room #:         440-P       [facility] [place]  
M.R.#:      [id number]                       Account #:      [id number]  
Admission:  02/02/21    Attend Phys:    Willi Solis MD    
Discharge:  02/05/21    Date of Birth:  [birth year]  
                                                          Report #: [id number]
                                                                    [id number]   
_______________________________________________________________________________
 
PHYSICAL EXAMINATION:  She is alert.  She is responsive.  She can follow simple
commands.  Her speech, concentration, fund of knowledge is unremarkable. 
Cranial nerve examination objectively looks unremarkable, but she says she has
trouble with vision on the left eye.  Her strength, sensation, reflexes and tone
look symmetrical.  There is no cerebellar sign.  I could not look at the
patient's fundus.  There is no meningeal sign.  There is no carotid bruit. 
Thyroid looks unremarkable.  Cardiac and respiratory examinations appear
noncontributory.  She has no edema, cyanosis or jaundice.  Blood pressure is
120/61, respirations 19, pulse is 43, temperature is 98.7.  Pulse has been
running somewhat low, but now it is 43, which is low.  She did have a CT chest
today, so they used some dye, but they did a CT scan of the head, which was
mostly unremarkable.  She has empty sella syndrome, which is an incidental
finding.
 
LABORATORY DATA:  Indicated normal white count.
 
IMPRESSION AND PLAN:  We need to rediscuss the situation with the patient after
discovering her old records.  Her EEG today is normal.  MRI is not done yet.  I
will see what the MRI shows.  The patient can have posttraumatic large vessel
dissections, but she is on anticoagulation, even if she has that.  I hate to
give her dye second time today because it is unlikely to change any treatment. 
I discussed all of it with the patient in detail.  We will just await the MRI in
this patient from neurological perspective, but with low pulse rate and with the
prior history, I will suggest also checking this patient for systemic causes for
her problem of passing out and systemic causes looks more likely in spite of the
prior history.  She may have to go to an epileptologist to have a prolonged EEG
done.  More than 50 minutes of time was spent taking care of this patient today
and majority was spent in counseling and coordinating.
 
 
 
 
 
 
 
 
 
 
 
 
 
 
 
  <ELECTRONICALLY SIGNED>
   By: Kyler Brunson MD         
  02/10/21     0346
D: 02/02/21 1804                           _____________________________________
T: 02/02/21 1956                           Kyler Brunson MD           /nt Dr Hurt notified that ultram 50 mg dose packs are 6 tabs not 3 tab. Dr. Hurt increased the amount for the takehome tyson,

## 2021-10-07 ENCOUNTER — HOSPITAL ENCOUNTER (EMERGENCY)
Dept: HOSPITAL 35 - ER | Age: 66
Discharge: HOME | End: 2021-10-07
Payer: COMMERCIAL

## 2021-10-07 VITALS — WEIGHT: 175 LBS | BODY MASS INDEX: 31.01 KG/M2 | HEIGHT: 63 IN

## 2021-10-07 VITALS — SYSTOLIC BLOOD PRESSURE: 125 MMHG | DIASTOLIC BLOOD PRESSURE: 54 MMHG

## 2021-10-07 DIAGNOSIS — K31.84: ICD-10-CM

## 2021-10-07 DIAGNOSIS — Z88.1: ICD-10-CM

## 2021-10-07 DIAGNOSIS — Z98.890: ICD-10-CM

## 2021-10-07 DIAGNOSIS — Z87.442: ICD-10-CM

## 2021-10-07 DIAGNOSIS — M19.90: ICD-10-CM

## 2021-10-07 DIAGNOSIS — I10: ICD-10-CM

## 2021-10-07 DIAGNOSIS — Z79.1: ICD-10-CM

## 2021-10-07 DIAGNOSIS — M79.7: ICD-10-CM

## 2021-10-07 DIAGNOSIS — Z79.899: ICD-10-CM

## 2021-10-07 DIAGNOSIS — Z88.6: ICD-10-CM

## 2021-10-07 DIAGNOSIS — Z88.8: ICD-10-CM

## 2021-10-07 DIAGNOSIS — E11.43: ICD-10-CM

## 2021-10-07 DIAGNOSIS — Z86.718: ICD-10-CM

## 2021-10-07 DIAGNOSIS — Z90.710: ICD-10-CM

## 2021-10-07 DIAGNOSIS — Z91.018: ICD-10-CM

## 2021-10-07 DIAGNOSIS — Z88.5: ICD-10-CM

## 2021-10-07 DIAGNOSIS — R10.31: Primary | ICD-10-CM

## 2021-10-07 DIAGNOSIS — Z79.891: ICD-10-CM

## 2021-10-07 LAB
ANION GAP SERPL CALC-SCNC: 8 MMOL/L (ref 7–16)
BACTERIA-REFLEX: (no result) /HPF
BASOPHILS NFR BLD AUTO: 0.4 % (ref 0–2)
BILIRUB UR-MCNC: NEGATIVE MG/DL
BUN SERPL-MCNC: 15 MG/DL (ref 7–18)
CALCIUM SERPL-MCNC: 9.1 MG/DL (ref 8.5–10.1)
CHLORIDE SERPL-SCNC: 105 MMOL/L (ref 98–107)
CO2 SERPL-SCNC: 29 MMOL/L (ref 21–32)
COLOR UR: YELLOW
CREAT SERPL-MCNC: 0.8 MG/DL (ref 0.6–1)
EOSINOPHIL NFR BLD: 1 % (ref 0–3)
ERYTHROCYTE [DISTWIDTH] IN BLOOD BY AUTOMATED COUNT: 13.5 % (ref 10.5–14.5)
GLUCOSE SERPL-MCNC: 112 MG/DL (ref 74–106)
GRANULOCYTES NFR BLD MANUAL: 50.1 % (ref 36–66)
HCT VFR BLD CALC: 40.1 % (ref 37–47)
HGB BLD-MCNC: 13.3 GM/DL (ref 12–15)
KETONES UR STRIP-MCNC: NEGATIVE MG/DL
LYMPHOCYTES NFR BLD AUTO: 41.8 % (ref 24–44)
MCH RBC QN AUTO: 30.3 PG (ref 26–34)
MCHC RBC AUTO-ENTMCNC: 33.2 G/DL (ref 28–37)
MCV RBC: 91.3 FL (ref 80–100)
MONOCYTES NFR BLD: 6.7 % (ref 1–8)
NEUTROPHILS # BLD: 2.7 THOU/UL (ref 1.4–8.2)
PLATELET # BLD: 211 THOU/UL (ref 150–400)
POTASSIUM SERPL-SCNC: 3.7 MMOL/L (ref 3.5–5.1)
RBC # BLD AUTO: 4.39 MIL/UL (ref 4.2–5)
RBC # UR STRIP: (no result) /UL
RBC #/AREA URNS HPF: (no result) /HPF
SODIUM SERPL-SCNC: 142 MMOL/L (ref 136–145)
SP GR UR STRIP: 1.02 (ref 1–1.03)
SQUAMOUS: (no result) /LPF (ref 0–3)
URINE CLARITY: CLEAR
URINE GLUCOSE-RANDOM*: NEGATIVE
URINE LEUKOCYTES-REFLEX: NEGATIVE
URINE NITRITE-REFLEX: NEGATIVE
URINE PROTEIN (DIPSTICK): NEGATIVE
URINE WBC-REFLEX: (no result) /HPF (ref 0–5)
UROBILINOGEN UR STRIP-ACNC: 0.2 E.U./DL (ref 0.2–1)
WBC # BLD AUTO: 5.5 THOU/UL (ref 4–11)

## 2021-10-08 ENCOUNTER — HOSPITAL ENCOUNTER (INPATIENT)
Dept: HOSPITAL 35 - ER | Age: 66
LOS: 3 days | Discharge: HOME | DRG: 696 | End: 2021-10-11
Attending: INTERNAL MEDICINE | Admitting: INTERNAL MEDICINE
Payer: COMMERCIAL

## 2021-10-08 VITALS — SYSTOLIC BLOOD PRESSURE: 138 MMHG | DIASTOLIC BLOOD PRESSURE: 69 MMHG

## 2021-10-08 VITALS — SYSTOLIC BLOOD PRESSURE: 122 MMHG | DIASTOLIC BLOOD PRESSURE: 80 MMHG

## 2021-10-08 VITALS — DIASTOLIC BLOOD PRESSURE: 55 MMHG | SYSTOLIC BLOOD PRESSURE: 100 MMHG

## 2021-10-08 VITALS — WEIGHT: 184.8 LBS | BODY MASS INDEX: 32.74 KG/M2 | HEIGHT: 62.99 IN

## 2021-10-08 VITALS — DIASTOLIC BLOOD PRESSURE: 72 MMHG | SYSTOLIC BLOOD PRESSURE: 130 MMHG

## 2021-10-08 VITALS — SYSTOLIC BLOOD PRESSURE: 137 MMHG | DIASTOLIC BLOOD PRESSURE: 69 MMHG

## 2021-10-08 DIAGNOSIS — G89.29: ICD-10-CM

## 2021-10-08 DIAGNOSIS — M19.90: ICD-10-CM

## 2021-10-08 DIAGNOSIS — Z87.442: ICD-10-CM

## 2021-10-08 DIAGNOSIS — I10: ICD-10-CM

## 2021-10-08 DIAGNOSIS — Z88.8: ICD-10-CM

## 2021-10-08 DIAGNOSIS — Z91.018: ICD-10-CM

## 2021-10-08 DIAGNOSIS — Z96.653: ICD-10-CM

## 2021-10-08 DIAGNOSIS — Z90.710: ICD-10-CM

## 2021-10-08 DIAGNOSIS — E11.9: ICD-10-CM

## 2021-10-08 DIAGNOSIS — N23: ICD-10-CM

## 2021-10-08 DIAGNOSIS — Z86.711: ICD-10-CM

## 2021-10-08 DIAGNOSIS — Z88.1: ICD-10-CM

## 2021-10-08 DIAGNOSIS — Z20.822: ICD-10-CM

## 2021-10-08 DIAGNOSIS — R31.0: Primary | ICD-10-CM

## 2021-10-08 LAB
ANION GAP SERPL CALC-SCNC: 8 MMOL/L (ref 7–16)
BASOPHILS NFR BLD AUTO: 0.8 % (ref 0–2)
BILIRUB UR-MCNC: NEGATIVE MG/DL
BUN SERPL-MCNC: 9 MG/DL (ref 7–18)
CALCIUM SERPL-MCNC: 9.3 MG/DL (ref 8.5–10.1)
CHLORIDE SERPL-SCNC: 105 MMOL/L (ref 98–107)
CO2 SERPL-SCNC: 30 MMOL/L (ref 21–32)
COLOR UR: YELLOW
CREAT SERPL-MCNC: 0.8 MG/DL (ref 0.6–1)
EOSINOPHIL NFR BLD: 1.1 % (ref 0–3)
ERYTHROCYTE [DISTWIDTH] IN BLOOD BY AUTOMATED COUNT: 13.9 % (ref 10.5–14.5)
GLUCOSE SERPL-MCNC: 112 MG/DL (ref 74–106)
GRANULOCYTES NFR BLD MANUAL: 58.3 % (ref 36–66)
HCT VFR BLD CALC: 40.6 % (ref 37–47)
HGB BLD-MCNC: 13.5 GM/DL (ref 12–15)
KETONES UR STRIP-MCNC: NEGATIVE MG/DL
LYMPHOCYTES NFR BLD AUTO: 34.5 % (ref 24–44)
MCH RBC QN AUTO: 30.5 PG (ref 26–34)
MCHC RBC AUTO-ENTMCNC: 33.2 G/DL (ref 28–37)
MCV RBC: 91.9 FL (ref 80–100)
MONOCYTES NFR BLD: 5.3 % (ref 1–8)
NEUTROPHILS # BLD: 3.3 THOU/UL (ref 1.4–8.2)
PLATELET # BLD: 215 THOU/UL (ref 150–400)
POTASSIUM SERPL-SCNC: 3.7 MMOL/L (ref 3.5–5.1)
RBC # BLD AUTO: 4.42 MIL/UL (ref 4.2–5)
RBC # UR STRIP: (no result) /UL
RBC #/AREA URNS HPF: (no result) /HPF
SODIUM SERPL-SCNC: 143 MMOL/L (ref 136–145)
SP GR UR STRIP: 1.02 (ref 1–1.03)
SQUAMOUS: (no result) /LPF (ref 0–3)
URINE CLARITY: (no result)
URINE GLUCOSE-RANDOM*: NEGATIVE
URINE LEUKOCYTES-REFLEX: NEGATIVE
URINE NITRITE-REFLEX: NEGATIVE
URINE PROTEIN (DIPSTICK): NEGATIVE
URINE WBC-REFLEX: (no result) /HPF (ref 0–5)
UROBILINOGEN UR STRIP-ACNC: 1 E.U./DL (ref 0.2–1)
WBC # BLD AUTO: 5.7 THOU/UL (ref 4–11)

## 2021-10-08 PROCEDURE — 10045: CPT

## 2021-10-08 NOTE — NUR
PT ADMITTED RELATED TO HEMTURIA AND ABD PAIN. CM REVIEWED CHART AND SPOKE WITH
CARE TEAM. CM MET WITH PT AND SPOUSE AT BEDSIDE THIS DAY. PT APPEARED TO BE
A&O X4. CM ROLE INTRODUCED. PT INDICATED SHE LIVES IN A TRI LEVEL HOUSE WITH
HER SPOUSE WITH 7 STEPS TO ENTER HOUSE THROUGH GARAGE 3 THROUGH FRONT, AND 7
TO BEDROOM LEVEL. PT INDIATED SHE HAD BEEN INDEPENDENT WITH GAIT AND ADLS PTA.
PT INDICATED PRIOR HH WITH [facility] LAST YEAR. PT'S PCP IS DR. EDSON FRANCIS. PT INDICATED SHE PLANS TO RETURN HOME ONCE MEDICALLY STABLE. CM
FOLLOWING REGARDING DC PLANNING.

## 2021-10-08 NOTE — NUR
Pt A & O x4. Pt VS stable. Pt admit to room 456 from ED. Pt is room air. Pt is
SBA with ambulation. Pt had pain and receive PRN pain medications and PRN
nausea medications. Pt  is at bedside.

## 2021-10-09 VITALS — SYSTOLIC BLOOD PRESSURE: 107 MMHG | DIASTOLIC BLOOD PRESSURE: 54 MMHG

## 2021-10-09 VITALS — DIASTOLIC BLOOD PRESSURE: 65 MMHG | SYSTOLIC BLOOD PRESSURE: 121 MMHG

## 2021-10-09 VITALS — DIASTOLIC BLOOD PRESSURE: 58 MMHG | SYSTOLIC BLOOD PRESSURE: 127 MMHG

## 2021-10-09 VITALS — DIASTOLIC BLOOD PRESSURE: 59 MMHG | SYSTOLIC BLOOD PRESSURE: 104 MMHG

## 2021-10-09 VITALS — DIASTOLIC BLOOD PRESSURE: 50 MMHG | SYSTOLIC BLOOD PRESSURE: 118 MMHG

## 2021-10-09 LAB
ERYTHROCYTE [DISTWIDTH] IN BLOOD BY AUTOMATED COUNT: 13.9 % (ref 10.5–14.5)
HCT VFR BLD CALC: 32.8 % (ref 37–47)
HCT VFR BLD CALC: 33.8 % (ref 37–47)
HCT VFR BLD CALC: 34.8 % (ref 37–47)
HCT VFR BLD CALC: 35 % (ref 37–47)
HGB BLD-MCNC: 11.1 GM/DL (ref 12–15)
HGB BLD-MCNC: 11.2 GM/DL (ref 12–15)
HGB BLD-MCNC: 11.4 GM/DL (ref 12–15)
HGB BLD-MCNC: 11.7 GM/DL (ref 12–15)
MCH RBC QN AUTO: 30.6 PG (ref 26–34)
MCHC RBC AUTO-ENTMCNC: 33.3 G/DL (ref 28–37)
MCV RBC: 91.8 FL (ref 80–100)
PLATELET # BLD: 185 THOU/UL (ref 150–400)
RBC # BLD AUTO: 3.68 MIL/UL (ref 4.2–5)
WBC # BLD AUTO: 5.4 THOU/UL (ref 4–11)
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
## 2021-10-09 NOTE — NUR
Pt. rested quietly at intervals during the night when checked on during
frequent rounds.  She c/o right flank pain and pain meds given (see emar)
with some relief noted.  Up ad pranav to the bathroom.  No reports of any
hematuria.

## 2021-10-10 VITALS — DIASTOLIC BLOOD PRESSURE: 71 MMHG | SYSTOLIC BLOOD PRESSURE: 140 MMHG

## 2021-10-10 VITALS — DIASTOLIC BLOOD PRESSURE: 59 MMHG | SYSTOLIC BLOOD PRESSURE: 124 MMHG

## 2021-10-10 VITALS — DIASTOLIC BLOOD PRESSURE: 64 MMHG | SYSTOLIC BLOOD PRESSURE: 126 MMHG

## 2021-10-10 VITALS — SYSTOLIC BLOOD PRESSURE: 166 MMHG | DIASTOLIC BLOOD PRESSURE: 74 MMHG

## 2021-10-10 VITALS — DIASTOLIC BLOOD PRESSURE: 66 MMHG | SYSTOLIC BLOOD PRESSURE: 138 MMHG

## 2021-10-10 NOTE — NUR
A/O X 4. ROOM AIR. SB ON TELE. AD VONNIE. LEFT AC IV WITH NS INFUSING @ 100
,LS/HR. BLOOD TINGED URINE. CT OF ABD + PELVIS COMPLETED TODAY. ABD PAIN.
MORPHINE AND OXYCODONE GIVEN PRN. HEATING PACK TO ABD HELPS PATIENT STATES.
 CAME TO VISIT

## 2021-10-10 NOTE — NUR
ASSUMED CARE AT 1900, PT REPORTED INTERMITTENT PAIN, ADDRESSED WITH
MEDICATION, MODERATE BLOOD TINGED URINE NOTED, NOTIFIED  AWAITING CALL
BACK, PT REMAINS LAYING IN BED, CALL LIGHT WITHIN REACH, ICED WATER ON BEDSIDE
TABLE, SAFETY ESTABLISHED, WILL CONTINUE TO MONITOR.

## 2021-10-11 VITALS — SYSTOLIC BLOOD PRESSURE: 175 MMHG | DIASTOLIC BLOOD PRESSURE: 78 MMHG

## 2021-10-11 VITALS — SYSTOLIC BLOOD PRESSURE: 142 MMHG | DIASTOLIC BLOOD PRESSURE: 71 MMHG

## 2021-10-11 VITALS — DIASTOLIC BLOOD PRESSURE: 60 MMHG | SYSTOLIC BLOOD PRESSURE: 135 MMHG

## 2021-10-11 VITALS — SYSTOLIC BLOOD PRESSURE: 176 MMHG | DIASTOLIC BLOOD PRESSURE: 90 MMHG

## 2021-10-11 VITALS — DIASTOLIC BLOOD PRESSURE: 90 MMHG | SYSTOLIC BLOOD PRESSURE: 171 MMHG

## 2021-10-11 NOTE — NUR
ASSUMED CARE OF PT AT SHIFT CHANGE. PT IS AOX4 AND LETS NEEDS BE KNOWN. PT IS
UP AD VONNIE. ASSESSMENT CHARTED. IV AND TELE REMOVED. PT DISCHARGED VIA
WHEELCHAIR AT 2220HRS IN STABLE CONDITION.

## 2021-10-11 NOTE — NUR
ASSUMED CARE OF PT AT SHIFT CHANGE. PT IS AOX4 AND LETS NEEDS BE KNOWN. PT IS
UP AD VONNIE. PT REPORTED SOME ABDOMINAL PAIN; PRNS PROVIDED. PT DENIED NAUSEA OR
SOA. SOME HEMATUREA REPORTED. VSS AND NO S/S OF ACUTE DISTRES. PT SLEPT PART
OF THE SHIFT. WILL CONTINUE TO MONITOR FOR CHANGES.

## 2021-10-11 NOTE — NUR
ASSUMED PT CARE THIS AM. PT A&OX4, ABLE TO MAKE NEEDS KNOWN. PATIENT REPORTING
PAIN IN THE ABDOMEN THAT DECREASES WITH PAIN MEDICATION GIVEN PER EMAR. IV
REMAINS PATENT. PATIENT REMAINS CONTINENT, AND IS UP INDEPENDENTLY TO THE
BATHROOM. MEDICATIONS TAKEN WITHOUT ISSUE THIS AM. PATIENT REMAINS ON TELE.
CALL LIGHT WITHIN REACH.

## 2021-12-02 ENCOUNTER — HOSPITAL ENCOUNTER (EMERGENCY)
Dept: HOSPITAL 35 - ER | Age: 66
Discharge: HOME | End: 2021-12-02
Payer: COMMERCIAL

## 2021-12-02 VITALS — HEIGHT: 63 IN | BODY MASS INDEX: 31.54 KG/M2 | WEIGHT: 178 LBS

## 2021-12-02 VITALS — SYSTOLIC BLOOD PRESSURE: 142 MMHG | DIASTOLIC BLOOD PRESSURE: 67 MMHG

## 2021-12-02 DIAGNOSIS — R10.9: Primary | ICD-10-CM

## 2021-12-02 DIAGNOSIS — Z88.6: ICD-10-CM

## 2021-12-02 DIAGNOSIS — Z88.8: ICD-10-CM

## 2021-12-02 DIAGNOSIS — M79.7: ICD-10-CM

## 2021-12-02 DIAGNOSIS — I10: ICD-10-CM

## 2021-12-02 DIAGNOSIS — E87.6: ICD-10-CM

## 2021-12-02 DIAGNOSIS — Z87.442: ICD-10-CM

## 2021-12-02 DIAGNOSIS — M19.90: ICD-10-CM

## 2021-12-02 DIAGNOSIS — Z86.711: ICD-10-CM

## 2021-12-02 DIAGNOSIS — Z90.710: ICD-10-CM

## 2021-12-02 DIAGNOSIS — Z88.1: ICD-10-CM

## 2021-12-02 DIAGNOSIS — Z79.899: ICD-10-CM

## 2021-12-02 LAB
ALBUMIN SERPL-MCNC: 3.5 G/DL (ref 3.4–5)
ALT SERPL-CCNC: 32 U/L (ref 30–65)
ANION GAP SERPL CALC-SCNC: 10 MMOL/L (ref 7–16)
AST SERPL-CCNC: 39 U/L (ref 15–37)
BACTERIA-REFLEX: (no result) /HPF
BILIRUB SERPL-MCNC: 0.3 MG/DL (ref 0.2–1)
BILIRUB UR-MCNC: NEGATIVE MG/DL
BILIRUB UR-MCNC: NEGATIVE MG/DL
BUN SERPL-MCNC: 9 MG/DL (ref 7–18)
CALCIUM SERPL-MCNC: 9 MG/DL (ref 8.5–10.1)
CHLORIDE SERPL-SCNC: 103 MMOL/L (ref 98–107)
CO2 SERPL-SCNC: 29 MMOL/L (ref 21–32)
COLOR UR: (no result)
COLOR UR: YELLOW
CREAT SERPL-MCNC: 0.8 MG/DL (ref 0.6–1)
ERYTHROCYTE [DISTWIDTH] IN BLOOD BY AUTOMATED COUNT: 14 % (ref 10.5–14.5)
GLUCOSE SERPL-MCNC: 107 MG/DL (ref 74–106)
HCT VFR BLD CALC: 39.9 % (ref 37–47)
HGB BLD-MCNC: 13.4 GM/DL (ref 12–15)
KETONES UR STRIP-MCNC: NEGATIVE MG/DL
KETONES UR STRIP-MCNC: NEGATIVE MG/DL
MCH RBC QN AUTO: 30.2 PG (ref 26–34)
MCHC RBC AUTO-ENTMCNC: 33.5 G/DL (ref 28–37)
MCV RBC: 90.3 FL (ref 80–100)
PLATELET # BLD: 238 THOU/UL (ref 150–400)
POTASSIUM SERPL-SCNC: 3 MMOL/L (ref 3.5–5.1)
PROT SERPL-MCNC: 7.6 G/DL (ref 6.4–8.2)
RBC # BLD AUTO: 4.42 MIL/UL (ref 4.2–5)
RBC # UR STRIP: (no result) /UL
RBC # UR STRIP: NEGATIVE /UL
RBC #/AREA URNS HPF: (no result) /HPF
SODIUM SERPL-SCNC: 142 MMOL/L (ref 136–145)
SP GR UR STRIP: 1.01 (ref 1–1.03)
SP GR UR STRIP: 1.01 (ref 1–1.03)
SQUAMOUS: (no result) /LPF (ref 0–3)
URINE CLARITY: (no result)
URINE CLARITY: CLEAR
URINE GLUCOSE-RANDOM*: NEGATIVE
URINE GLUCOSE-RANDOM*: NEGATIVE
URINE LEUKOCYTES-REFLEX: NEGATIVE
URINE LEUKOCYTES-REFLEX: NEGATIVE
URINE NITRITE-REFLEX: NEGATIVE
URINE NITRITE-REFLEX: NEGATIVE
URINE PROTEIN (DIPSTICK): NEGATIVE
URINE PROTEIN (DIPSTICK): NEGATIVE
URINE WBC-REFLEX: (no result) /HPF (ref 0–5)
UROBILINOGEN UR STRIP-ACNC: 0.2 E.U./DL (ref 0.2–1)
UROBILINOGEN UR STRIP-ACNC: 0.2 E.U./DL (ref 0.2–1)
WBC # BLD AUTO: 6 THOU/UL (ref 4–11)